# Patient Record
Sex: FEMALE | Race: WHITE | NOT HISPANIC OR LATINO | Employment: OTHER | RURAL
[De-identification: names, ages, dates, MRNs, and addresses within clinical notes are randomized per-mention and may not be internally consistent; named-entity substitution may affect disease eponyms.]

---

## 2021-06-15 ENCOUNTER — OFFICE VISIT (OUTPATIENT)
Dept: FAMILY MEDICINE | Facility: CLINIC | Age: 66
End: 2021-06-15
Payer: MEDICARE

## 2021-06-15 VITALS
BODY MASS INDEX: 27.94 KG/M2 | SYSTOLIC BLOOD PRESSURE: 139 MMHG | DIASTOLIC BLOOD PRESSURE: 80 MMHG | WEIGHT: 148 LBS | HEIGHT: 61 IN | HEART RATE: 93 BPM | TEMPERATURE: 98 F

## 2021-06-15 DIAGNOSIS — K21.9 GASTROESOPHAGEAL REFLUX DISEASE, UNSPECIFIED WHETHER ESOPHAGITIS PRESENT: ICD-10-CM

## 2021-06-15 DIAGNOSIS — E03.9 HYPOTHYROIDISM, UNSPECIFIED TYPE: ICD-10-CM

## 2021-06-15 DIAGNOSIS — J06.9 UPPER RESPIRATORY TRACT INFECTION, UNSPECIFIED TYPE: Primary | ICD-10-CM

## 2021-06-15 DIAGNOSIS — R05.9 COUGH: ICD-10-CM

## 2021-06-15 LAB
CTP QC/QA: YES
SARS-COV-2 AG RESP QL IA.RAPID: NEGATIVE

## 2021-06-15 PROCEDURE — 96372 THER/PROPH/DIAG INJ SC/IM: CPT | Mod: ,,, | Performed by: NURSE PRACTITIONER

## 2021-06-15 PROCEDURE — 3008F PR BODY MASS INDEX (BMI) DOCUMENTED: ICD-10-PCS | Mod: ,,, | Performed by: NURSE PRACTITIONER

## 2021-06-15 PROCEDURE — 96372 PR INJECTION,THERAP/PROPH/DIAG2ST, IM OR SUBCUT: ICD-10-PCS | Mod: ,,, | Performed by: NURSE PRACTITIONER

## 2021-06-15 PROCEDURE — 1159F MED LIST DOCD IN RCRD: CPT | Mod: ,,, | Performed by: NURSE PRACTITIONER

## 2021-06-15 PROCEDURE — 87426 SARS CORONAVIRUS 2 ANTIGEN POCT: ICD-10-PCS | Mod: QW,,, | Performed by: NURSE PRACTITIONER

## 2021-06-15 PROCEDURE — 3008F BODY MASS INDEX DOCD: CPT | Mod: ,,, | Performed by: NURSE PRACTITIONER

## 2021-06-15 PROCEDURE — 99214 PR OFFICE/OUTPT VISIT, EST, LEVL IV, 30-39 MIN: ICD-10-PCS | Mod: 25,,, | Performed by: NURSE PRACTITIONER

## 2021-06-15 PROCEDURE — 1126F AMNT PAIN NOTED NONE PRSNT: CPT | Mod: ,,, | Performed by: NURSE PRACTITIONER

## 2021-06-15 PROCEDURE — 87426 SARSCOV CORONAVIRUS AG IA: CPT | Mod: QW,,, | Performed by: NURSE PRACTITIONER

## 2021-06-15 PROCEDURE — 3288F PR FALLS RISK ASSESSMENT DOCUMENTED: ICD-10-PCS | Mod: ,,, | Performed by: NURSE PRACTITIONER

## 2021-06-15 PROCEDURE — 80053 COMPREHEN METABOLIC PANEL: CPT | Mod: ,,, | Performed by: CLINICAL MEDICAL LABORATORY

## 2021-06-15 PROCEDURE — 1159F PR MEDICATION LIST DOCUMENTED IN MEDICAL RECORD: ICD-10-PCS | Mod: ,,, | Performed by: NURSE PRACTITIONER

## 2021-06-15 PROCEDURE — 99214 OFFICE O/P EST MOD 30 MIN: CPT | Mod: 25,,, | Performed by: NURSE PRACTITIONER

## 2021-06-15 PROCEDURE — 1101F PT FALLS ASSESS-DOCD LE1/YR: CPT | Mod: ,,, | Performed by: NURSE PRACTITIONER

## 2021-06-15 PROCEDURE — 1126F PR PAIN SEVERITY QUANTIFIED, NO PAIN PRESENT: ICD-10-PCS | Mod: ,,, | Performed by: NURSE PRACTITIONER

## 2021-06-15 PROCEDURE — 1101F PR PT FALLS ASSESS DOC 0-1 FALLS W/OUT INJ PAST YR: ICD-10-PCS | Mod: ,,, | Performed by: NURSE PRACTITIONER

## 2021-06-15 PROCEDURE — 3288F FALL RISK ASSESSMENT DOCD: CPT | Mod: ,,, | Performed by: NURSE PRACTITIONER

## 2021-06-15 PROCEDURE — 80053 COMPREHENSIVE METABOLIC PANEL: ICD-10-PCS | Mod: ,,, | Performed by: CLINICAL MEDICAL LABORATORY

## 2021-06-15 RX ORDER — PANTOPRAZOLE SODIUM 40 MG/1
40 TABLET, DELAYED RELEASE ORAL DAILY
COMMUNITY
Start: 2021-06-14 | End: 2023-10-12

## 2021-06-15 RX ORDER — LEVOTHYROXINE SODIUM 75 UG/1
75 TABLET ORAL DAILY
COMMUNITY
Start: 2021-05-10 | End: 2021-06-17 | Stop reason: SDUPTHER

## 2021-06-15 RX ORDER — GUAIFENESIN 600 MG/1
600 TABLET, EXTENDED RELEASE ORAL 2 TIMES DAILY PRN
Qty: 20 TABLET | Refills: 0 | Status: SHIPPED | OUTPATIENT
Start: 2021-06-15 | End: 2021-06-25

## 2021-06-15 RX ORDER — AZITHROMYCIN 250 MG/1
TABLET, FILM COATED ORAL
Qty: 6 TABLET | Refills: 0 | Status: SHIPPED | OUTPATIENT
Start: 2021-06-15 | End: 2021-06-20

## 2021-06-15 RX ORDER — CEFTRIAXONE 1 G/1
1 INJECTION, POWDER, FOR SOLUTION INTRAMUSCULAR; INTRAVENOUS
Status: COMPLETED | OUTPATIENT
Start: 2021-06-15 | End: 2021-06-15

## 2021-06-15 RX ADMIN — CEFTRIAXONE 1 G: 1 INJECTION, POWDER, FOR SOLUTION INTRAMUSCULAR; INTRAVENOUS at 03:06

## 2021-06-16 LAB
ALBUMIN SERPL BCP-MCNC: 3.9 G/DL (ref 3.5–5)
ALBUMIN/GLOB SERPL: 1.3 {RATIO}
ALP SERPL-CCNC: 101 U/L (ref 55–142)
ALT SERPL W P-5'-P-CCNC: 35 U/L (ref 13–56)
ANION GAP SERPL CALCULATED.3IONS-SCNC: 12 MMOL/L (ref 7–16)
AST SERPL W P-5'-P-CCNC: 33 U/L (ref 15–37)
BASOPHILS # BLD AUTO: 0.09 K/UL (ref 0–0.2)
BASOPHILS NFR BLD AUTO: 0.9 % (ref 0–1)
BILIRUB SERPL-MCNC: 0.4 MG/DL (ref 0–1.2)
BUN SERPL-MCNC: 5 MG/DL (ref 7–18)
BUN/CREAT SERPL: 7 (ref 6–20)
BURR CELLS BLD QL SMEAR: ABNORMAL
CALCIUM SERPL-MCNC: 8.7 MG/DL (ref 8.5–10.1)
CHLORIDE SERPL-SCNC: 94 MMOL/L (ref 98–107)
CO2 SERPL-SCNC: 25 MMOL/L (ref 21–32)
CREAT SERPL-MCNC: 0.71 MG/DL (ref 0.55–1.02)
DIFFERENTIAL METHOD BLD: ABNORMAL
EOSINOPHIL # BLD AUTO: 0.71 K/UL (ref 0–0.5)
EOSINOPHIL NFR BLD AUTO: 7 % (ref 1–4)
ERYTHROCYTE [DISTWIDTH] IN BLOOD BY AUTOMATED COUNT: 15.6 % (ref 11.5–14.5)
GLOBULIN SER-MCNC: 2.9 G/DL (ref 2–4)
GLUCOSE SERPL-MCNC: 96 MG/DL (ref 74–106)
HCT VFR BLD AUTO: 36.6 % (ref 38–47)
HGB BLD-MCNC: 12.6 G/DL (ref 12–16)
IMM GRANULOCYTES # BLD AUTO: 0.06 K/UL (ref 0–0.04)
IMM GRANULOCYTES NFR BLD: 0.6 % (ref 0–0.4)
LYMPHOCYTES # BLD AUTO: 1.23 K/UL (ref 1–4.8)
LYMPHOCYTES NFR BLD AUTO: 12.1 % (ref 27–41)
MCH RBC QN AUTO: 28.3 PG (ref 27–31)
MCHC RBC AUTO-ENTMCNC: 34.4 G/DL (ref 32–36)
MCV RBC AUTO: 82.2 FL (ref 80–96)
MONOCYTES # BLD AUTO: 0.63 K/UL (ref 0–0.8)
MONOCYTES NFR BLD AUTO: 6.2 % (ref 2–6)
MPC BLD CALC-MCNC: 14.3 FL (ref 9.4–12.4)
NEUTROPHILS # BLD AUTO: 7.43 K/UL (ref 1.8–7.7)
NEUTROPHILS NFR BLD AUTO: 73.2 % (ref 53–65)
NRBC # BLD AUTO: 0 X10E3/UL
NRBC, AUTO (.00): 0 %
OVALOCYTES BLD QL SMEAR: ABNORMAL
PLATELET # BLD AUTO: 217 K/UL (ref 150–400)
PLATELET MORPHOLOGY: ABNORMAL
POTASSIUM SERPL-SCNC: 5.2 MMOL/L (ref 3.5–5.1)
PROT SERPL-MCNC: 6.8 G/DL (ref 6.4–8.2)
RBC # BLD AUTO: 4.45 M/UL (ref 4.2–5.4)
SODIUM SERPL-SCNC: 126 MMOL/L (ref 136–145)
TSH SERPL DL<=0.005 MIU/L-ACNC: 1.27 UIU/ML (ref 0.36–3.74)
WBC # BLD AUTO: 10.15 K/UL (ref 4.5–11)

## 2021-06-17 RX ORDER — LEVOTHYROXINE SODIUM 75 UG/1
75 TABLET ORAL DAILY
Qty: 90 TABLET | Refills: 0 | Status: SHIPPED | OUTPATIENT
Start: 2021-06-17 | End: 2021-10-07 | Stop reason: SDUPTHER

## 2021-07-16 ENCOUNTER — HOSPITAL ENCOUNTER (EMERGENCY)
Facility: HOSPITAL | Age: 66
Discharge: HOME | End: 2021-07-17
Payer: MEDICARE

## 2021-07-16 DIAGNOSIS — R11.2 NON-INTRACTABLE VOMITING WITH NAUSEA, UNSPECIFIED VOMITING TYPE: ICD-10-CM

## 2021-07-16 DIAGNOSIS — N39.0 URINARY TRACT INFECTION WITHOUT HEMATURIA, SITE UNSPECIFIED: Primary | ICD-10-CM

## 2021-07-16 PROCEDURE — 99283 EMERGENCY DEPT VISIT LOW MDM: CPT | Mod: GF | Performed by: NURSE PRACTITIONER

## 2021-07-16 PROCEDURE — 99284 EMERGENCY DEPT VISIT MOD MDM: CPT | Mod: 25

## 2021-07-16 PROCEDURE — 96375 TX/PRO/DX INJ NEW DRUG ADDON: CPT

## 2021-07-16 PROCEDURE — 96361 HYDRATE IV INFUSION ADD-ON: CPT

## 2021-07-16 PROCEDURE — 96368 THER/DIAG CONCURRENT INF: CPT

## 2021-07-16 PROCEDURE — 96365 THER/PROPH/DIAG IV INF INIT: CPT

## 2021-07-16 RX ORDER — ONDANSETRON 2 MG/ML
4 INJECTION INTRAMUSCULAR; INTRAVENOUS
Status: COMPLETED | OUTPATIENT
Start: 2021-07-17 | End: 2021-07-17

## 2021-07-17 ENCOUNTER — TELEPHONE (OUTPATIENT)
Dept: EMERGENCY MEDICINE | Facility: HOSPITAL | Age: 66
End: 2021-07-17

## 2021-07-17 VITALS
TEMPERATURE: 99 F | WEIGHT: 148 LBS | HEIGHT: 61 IN | DIASTOLIC BLOOD PRESSURE: 87 MMHG | OXYGEN SATURATION: 96 % | HEART RATE: 105 BPM | RESPIRATION RATE: 20 BRPM | SYSTOLIC BLOOD PRESSURE: 118 MMHG | BODY MASS INDEX: 27.94 KG/M2

## 2021-07-17 LAB
ALBUMIN SERPL BCP-MCNC: 4.2 G/DL (ref 3.5–5)
ALBUMIN/GLOB SERPL: 1.4 {RATIO}
ALP SERPL-CCNC: 104 U/L (ref 55–142)
ALT SERPL W P-5'-P-CCNC: 39 U/L (ref 13–56)
ANION GAP SERPL CALCULATED.3IONS-SCNC: 16 MMOL/L (ref 7–16)
ANISOCYTOSIS BLD QL SMEAR: NORMAL
AST SERPL W P-5'-P-CCNC: 21 U/L (ref 15–37)
BACTERIA #/AREA URNS HPF: ABNORMAL /HPF
BASOPHILS # BLD AUTO: 0.03 K/UL (ref 0–0.2)
BASOPHILS NFR BLD AUTO: 0.3 % (ref 0–1)
BILIRUB SERPL-MCNC: 0.7 MG/DL (ref 0–1.2)
BILIRUB UR QL STRIP: NEGATIVE
BUN SERPL-MCNC: 12 MG/DL (ref 7–18)
BUN/CREAT SERPL: 12 (ref 6–20)
CALCIUM SERPL-MCNC: 8.9 MG/DL (ref 8.5–10.1)
CHLORIDE SERPL-SCNC: 108 MMOL/L (ref 98–107)
CLARITY UR: ABNORMAL
CO2 SERPL-SCNC: 25 MMOL/L (ref 21–32)
COLOR UR: ABNORMAL
CREAT SERPL-MCNC: 0.97 MG/DL (ref 0.55–1.02)
DIFFERENTIAL METHOD BLD: ABNORMAL
EOSINOPHIL # BLD AUTO: 0.06 K/UL (ref 0–0.5)
EOSINOPHIL NFR BLD AUTO: 0.6 % (ref 1–4)
ERYTHROCYTE [DISTWIDTH] IN BLOOD BY AUTOMATED COUNT: 14.7 % (ref 11.5–14.5)
GLOBULIN SER-MCNC: 3.1 G/DL (ref 2–4)
GLUCOSE SERPL-MCNC: 124 MG/DL (ref 74–106)
GLUCOSE UR STRIP-MCNC: NEGATIVE MG/DL
HCT VFR BLD AUTO: 41.1 % (ref 38–47)
HGB BLD-MCNC: 13.6 G/DL (ref 12–16)
KETONES UR STRIP-SCNC: 15 MG/DL
LEUKOCYTE ESTERASE UR QL STRIP: ABNORMAL
LYMPHOCYTES # BLD AUTO: 1.38 K/UL (ref 1–4.8)
LYMPHOCYTES NFR BLD AUTO: 12.9 % (ref 27–41)
MCH RBC QN AUTO: 26.5 PG (ref 27–31)
MCHC RBC AUTO-ENTMCNC: 33.1 G/DL (ref 32–36)
MCV RBC AUTO: 80 FL (ref 80–96)
MONOCYTES # BLD AUTO: 0.54 K/UL (ref 0–0.8)
MONOCYTES NFR BLD AUTO: 5.1 % (ref 2–6)
MPC BLD CALC-MCNC: 12.4 FL (ref 9.4–12.4)
MUCOUS THREADS #/AREA URNS HPF: ABNORMAL /HPF
NEUTROPHILS # BLD AUTO: 8.68 K/UL (ref 1.8–7.7)
NEUTROPHILS NFR BLD AUTO: 81.1 % (ref 53–65)
NITRITE UR QL STRIP: NEGATIVE
PH UR STRIP: 6 PH UNITS
PLATELET # BLD AUTO: 293 K/UL (ref 150–400)
PLATELET MORPHOLOGY: NORMAL
POTASSIUM SERPL-SCNC: 4.2 MMOL/L (ref 3.5–5.1)
PROT SERPL-MCNC: 7.3 G/DL (ref 6.4–8.2)
PROT UR QL STRIP: NEGATIVE
RBC # BLD AUTO: 5.14 M/UL (ref 4.2–5.4)
RBC # UR STRIP: NEGATIVE /UL
RBC #/AREA URNS HPF: ABNORMAL /HPF
SODIUM SERPL-SCNC: 145 MMOL/L (ref 136–145)
SP GR UR STRIP: 1.02
SQUAMOUS #/AREA URNS LPF: ABNORMAL /LPF
UROBILINOGEN UR STRIP-ACNC: 0.2 MG/DL
WBC # BLD AUTO: 10.69 K/UL (ref 4.5–11)
WBC #/AREA URNS HPF: ABNORMAL /HPF

## 2021-07-17 PROCEDURE — 85025 COMPLETE CBC W/AUTO DIFF WBC: CPT | Performed by: NURSE PRACTITIONER

## 2021-07-17 PROCEDURE — 87086 URINE CULTURE/COLONY COUNT: CPT | Performed by: NURSE PRACTITIONER

## 2021-07-17 PROCEDURE — 81003 URINALYSIS AUTO W/O SCOPE: CPT | Performed by: NURSE PRACTITIONER

## 2021-07-17 PROCEDURE — 36415 COLL VENOUS BLD VENIPUNCTURE: CPT | Performed by: NURSE PRACTITIONER

## 2021-07-17 PROCEDURE — 81001 URINALYSIS AUTO W/SCOPE: CPT | Performed by: NURSE PRACTITIONER

## 2021-07-17 PROCEDURE — 80053 COMPREHEN METABOLIC PANEL: CPT | Performed by: NURSE PRACTITIONER

## 2021-07-17 PROCEDURE — 63600175 PHARM REV CODE 636 W HCPCS: Performed by: NURSE PRACTITIONER

## 2021-07-17 PROCEDURE — 25000003 PHARM REV CODE 250: Performed by: NURSE PRACTITIONER

## 2021-07-17 RX ORDER — CEFUROXIME AXETIL 250 MG/1
250 TABLET ORAL 2 TIMES DAILY
Qty: 14 TABLET | Refills: 0 | Status: SHIPPED | OUTPATIENT
Start: 2021-07-17 | End: 2022-04-07 | Stop reason: CLARIF

## 2021-07-17 RX ORDER — ONDANSETRON 4 MG/1
4 TABLET, ORALLY DISINTEGRATING ORAL EVERY 8 HOURS PRN
Qty: 20 TABLET | Refills: 0 | Status: SHIPPED | OUTPATIENT
Start: 2021-07-17 | End: 2022-04-07 | Stop reason: CLARIF

## 2021-07-17 RX ADMIN — CEFTRIAXONE 1 G: 1 INJECTION, POWDER, FOR SOLUTION INTRAMUSCULAR; INTRAVENOUS at 01:07

## 2021-07-17 RX ADMIN — SODIUM CHLORIDE 1000 ML: 9 INJECTION, SOLUTION INTRAVENOUS at 12:07

## 2021-07-17 RX ADMIN — PROMETHAZINE HYDROCHLORIDE 25 MG: 25 INJECTION INTRAMUSCULAR; INTRAVENOUS at 01:07

## 2021-07-17 RX ADMIN — ONDANSETRON 4 MG: 2 INJECTION INTRAMUSCULAR; INTRAVENOUS at 12:07

## 2021-07-19 LAB — UA COMPLETE W REFLEX CULTURE PNL UR: NORMAL

## 2021-10-07 RX ORDER — LEVOTHYROXINE SODIUM 75 UG/1
75 TABLET ORAL DAILY
Qty: 90 TABLET | Refills: 0 | Status: CANCELLED | OUTPATIENT
Start: 2021-10-07

## 2021-10-07 RX ORDER — LEVOTHYROXINE SODIUM 75 UG/1
75 TABLET ORAL DAILY
Qty: 90 TABLET | Refills: 0 | Status: SHIPPED | OUTPATIENT
Start: 2021-10-07 | End: 2022-01-19 | Stop reason: SDUPTHER

## 2022-01-19 DIAGNOSIS — E03.9 HYPOTHYROIDISM, UNSPECIFIED TYPE: Primary | ICD-10-CM

## 2022-01-19 RX ORDER — LEVOTHYROXINE SODIUM 75 UG/1
75 TABLET ORAL DAILY
Qty: 30 TABLET | Refills: 0 | Status: SHIPPED | OUTPATIENT
Start: 2022-01-19 | End: 2022-01-20 | Stop reason: SDUPTHER

## 2022-01-20 ENCOUNTER — OFFICE VISIT (OUTPATIENT)
Dept: FAMILY MEDICINE | Facility: CLINIC | Age: 67
End: 2022-01-20
Payer: MEDICARE

## 2022-01-20 DIAGNOSIS — Z20.822 COUGH WITH EXPOSURE TO SEVERE ACUTE RESPIRATORY SYNDROME CORONAVIRUS 2 (SARS-COV-2): Primary | ICD-10-CM

## 2022-01-20 DIAGNOSIS — R05.8 COUGH WITH EXPOSURE TO SEVERE ACUTE RESPIRATORY SYNDROME CORONAVIRUS 2 (SARS-COV-2): Primary | ICD-10-CM

## 2022-01-20 DIAGNOSIS — Z79.899 LONG-TERM USE OF HIGH-RISK MEDICATION: ICD-10-CM

## 2022-01-20 DIAGNOSIS — E03.9 HYPOTHYROIDISM, UNSPECIFIED TYPE: ICD-10-CM

## 2022-01-20 LAB
CTP QC/QA: YES
SARS-COV-2 AG RESP QL IA.RAPID: NEGATIVE

## 2022-01-20 PROCEDURE — 87426 SARSCOV CORONAVIRUS AG IA: CPT | Mod: QW,,, | Performed by: NURSE PRACTITIONER

## 2022-01-20 PROCEDURE — 87426 SARS CORONAVIRUS 2 ANTIGEN POCT: ICD-10-PCS | Mod: QW,,, | Performed by: NURSE PRACTITIONER

## 2022-01-20 PROCEDURE — 1160F PR REVIEW ALL MEDS BY PRESCRIBER/CLIN PHARMACIST DOCUMENTED: ICD-10-PCS | Mod: ,,, | Performed by: NURSE PRACTITIONER

## 2022-01-20 PROCEDURE — 1159F MED LIST DOCD IN RCRD: CPT | Mod: ,,, | Performed by: NURSE PRACTITIONER

## 2022-01-20 PROCEDURE — 99213 PR OFFICE/OUTPT VISIT, EST, LEVL III, 20-29 MIN: ICD-10-PCS | Mod: ,,, | Performed by: NURSE PRACTITIONER

## 2022-01-20 PROCEDURE — 1160F RVW MEDS BY RX/DR IN RCRD: CPT | Mod: ,,, | Performed by: NURSE PRACTITIONER

## 2022-01-20 PROCEDURE — 99213 OFFICE O/P EST LOW 20 MIN: CPT | Mod: ,,, | Performed by: NURSE PRACTITIONER

## 2022-01-20 PROCEDURE — 1159F PR MEDICATION LIST DOCUMENTED IN MEDICAL RECORD: ICD-10-PCS | Mod: ,,, | Performed by: NURSE PRACTITIONER

## 2022-01-20 RX ORDER — LEVOTHYROXINE SODIUM 75 UG/1
75 TABLET ORAL DAILY
Qty: 30 TABLET | Refills: 5 | Status: SHIPPED | OUTPATIENT
Start: 2022-01-20 | End: 2023-10-12

## 2022-01-20 NOTE — PATIENT INSTRUCTIONS
Lab not available in clinic at this time  Will obtain lab at next office visit     COVID griselda antigen negative discussed potential for false negative results  S/s for which to immediately rtc or ed discussed     Refills on levothyroxine sent to pharmacy

## 2022-01-24 NOTE — PROGRESS NOTES
Clinic note     Patient name: Felicia Grijalva is a 66 y.o. female   Chief compliant   Chief Complaint   Patient presents with    Thyroid Problem     Patient janet at the lab here for thyroid lab, wanting covid test says her sister tested  positive around christofer.       Subjective     History of present illness   Pt seen janet for COVID testing   Pt is symptomatic   Symptoms started 2-3 days ago   Exposure sister has been sick for a week, but tested negative for COVID  Vaccine no vaccination  Requesting refills on levothyroxine while at clinic today         Social History     Tobacco Use    Smoking status: Never Smoker    Smokeless tobacco: Never Used   Substance Use Topics    Alcohol use: Not Currently    Drug use: Never       Review of patient's allergies indicates:  No Known Allergies    Past Medical History:   Diagnosis Date    Bipolar disorder     GERD (gastroesophageal reflux disease)     Hypothyroidism        History reviewed. No pertinent surgical history.     History reviewed. No pertinent family history.      Current Outpatient Medications:     cefUROXime (CEFTIN) 250 MG tablet, Take 1 tablet (250 mg total) by mouth 2 (two) times daily., Disp: 14 tablet, Rfl: 0    levothyroxine (SYNTHROID) 75 MCG tablet, Take 1 tablet (75 mcg total) by mouth once daily., Disp: 30 tablet, Rfl: 5    ondansetron (ZOFRAN-ODT) 4 MG TbDL, Take 1 tablet (4 mg total) by mouth every 8 (eight) hours as needed (nausea/vomiting)., Disp: 20 tablet, Rfl: 0    pantoprazole (PROTONIX) 40 MG tablet, Take 40 mg by mouth once daily., Disp: , Rfl:     Review of Systems   Constitutional: Negative for chills, fever and unexpected weight change.   HENT: Negative for nasal congestion and sore throat.    Respiratory: Negative for cough and shortness of breath.    Cardiovascular: Negative for chest pain.   Gastrointestinal: Negative for diarrhea, nausea and vomiting.   Genitourinary: Negative for dysuria.   Musculoskeletal:  Negative for myalgias.   Neurological: Negative for headaches.       Objective     There were no vitals taken for this visit.    Physical Exam   Constitutional: She is oriented to person, place, and time. No distress.   HENT:   Head: Normocephalic.   Eyes: Pupils are equal, round, and reactive to light. Conjunctivae are normal.   Cardiovascular: Normal rate and regular rhythm.   Pulmonary/Chest: Breath sounds normal. No respiratory distress. She has no wheezes. She has no rhonchi. She has no rales.   Neurological: She is alert and oriented to person, place, and time.   Skin: Skin is warm and dry.   Psychiatric: Her behavior is normal. Mood normal.     Component Ref Range & Units 4 d ago 7 mo ago   SARS Coronavirus 2 Antigen Negative Negative  Negative     Acceptable  Yes  Yes               Specimen Collected: 01/20/22 16:13             Lab Results   Component Value Date    WBC 10.69 07/17/2021    HGB 13.6 07/17/2021    HCT 41.1 07/17/2021    MCV 80.0 07/17/2021     07/17/2021       CMP  Sodium   Date Value Ref Range Status   07/17/2021 145 136 - 145 mmol/L Final     Potassium   Date Value Ref Range Status   07/17/2021 4.2 3.5 - 5.1 mmol/L Final     Chloride   Date Value Ref Range Status   07/17/2021 108 (H) 98 - 107 mmol/L Final     CO2   Date Value Ref Range Status   07/17/2021 25 21 - 32 mmol/L Final     Glucose   Date Value Ref Range Status   07/17/2021 124 (H) 74 - 106 mg/dL Final     BUN   Date Value Ref Range Status   07/17/2021 12 7 - 18 mg/dL Final     Creatinine   Date Value Ref Range Status   07/17/2021 0.97 0.55 - 1.02 mg/dL Final     Calcium   Date Value Ref Range Status   07/17/2021 8.9 8.5 - 10.1 mg/dL Final     Total Protein   Date Value Ref Range Status   07/17/2021 7.3 6.4 - 8.2 g/dL Final     Albumin   Date Value Ref Range Status   07/17/2021 4.2 3.5 - 5.0 g/dL Final     Bilirubin, Total   Date Value Ref Range Status   07/17/2021 0.7 >0.0 - 1.2 mg/dL Final     Alk Phos   Date  Value Ref Range Status   07/17/2021 104 55 - 142 U/L Final     AST   Date Value Ref Range Status   07/17/2021 21 15 - 37 U/L Final     ALT   Date Value Ref Range Status   07/17/2021 39 13 - 56 U/L Final     Anion Gap   Date Value Ref Range Status   07/17/2021 16 7 - 16 mmol/L Final     eGFR   Date Value Ref Range Status   07/17/2021 61 >=60 mL/min/1.73m² Final     Lab Results   Component Value Date    TSH 1.270 06/15/2021     No results found for: CHOL  No results found for: HDL  No results found for: LDLCALC  No results found for: TRIG  No results found for: CHOLHDL  No results found for: LABA1C, HGBA1C      Assessment and Plan   Cough with exposure to severe acute respiratory syndrome coronavirus 2 (SARS-CoV-2)  -     SARS Coronavirus 2 Antigen, POCT    Hypothyroidism, unspecified type  -     levothyroxine (SYNTHROID) 75 MCG tablet; Take 1 tablet (75 mcg total) by mouth once daily.  Dispense: 30 tablet; Refill: 5    Long-term use of high-risk medication          Patient Instructions  Patient Instructions   Lab not available in clinic at this time  Will obtain lab at next office visit     COVID griselda antigen negative discussed potential for false negative results  S/s for which to immediately rtc or ed discussed

## 2022-04-07 ENCOUNTER — HOSPITAL ENCOUNTER (EMERGENCY)
Facility: HOSPITAL | Age: 67
Discharge: PSYCHIATRIC OR SUBSTANCE USE FACILITY | End: 2022-04-07
Attending: FAMILY MEDICINE
Payer: MEDICARE

## 2022-04-07 VITALS
TEMPERATURE: 99 F | OXYGEN SATURATION: 94 % | BODY MASS INDEX: 24.55 KG/M2 | SYSTOLIC BLOOD PRESSURE: 135 MMHG | DIASTOLIC BLOOD PRESSURE: 71 MMHG | HEART RATE: 105 BPM | HEIGHT: 61 IN | WEIGHT: 130 LBS | RESPIRATION RATE: 18 BRPM

## 2022-04-07 DIAGNOSIS — F29 PSYCHOSIS, UNSPECIFIED PSYCHOSIS TYPE: Primary | ICD-10-CM

## 2022-04-07 LAB
ALBUMIN SERPL BCP-MCNC: 4.5 G/DL (ref 3.5–5)
ALBUMIN/GLOB SERPL: 1.6 {RATIO}
ALP SERPL-CCNC: 90 U/L (ref 55–142)
ALT SERPL W P-5'-P-CCNC: 33 U/L (ref 13–56)
AMPHET UR QL SCN: NEGATIVE
ANION GAP SERPL CALCULATED.3IONS-SCNC: 19 MMOL/L (ref 7–16)
APAP SERPL-MCNC: 21 ΜG/ML (ref 10–30)
AST SERPL W P-5'-P-CCNC: 28 U/L (ref 15–37)
BARBITURATES UR QL SCN: NEGATIVE
BASOPHILS # BLD AUTO: 0.04 K/UL (ref 0–0.2)
BASOPHILS NFR BLD AUTO: 0.3 % (ref 0–1)
BENZODIAZ METAB UR QL SCN: NEGATIVE
BILIRUB SERPL-MCNC: 0.2 MG/DL (ref 0–1.2)
BILIRUB UR QL STRIP: NEGATIVE
BUN SERPL-MCNC: 10 MG/DL (ref 7–18)
BUN/CREAT SERPL: 11 (ref 6–20)
CALCIUM SERPL-MCNC: 9.3 MG/DL (ref 8.5–10.1)
CANNABINOIDS UR QL SCN: NEGATIVE
CHLORIDE SERPL-SCNC: 101 MMOL/L (ref 98–107)
CLARITY UR: CLEAR
CO2 SERPL-SCNC: 20 MMOL/L (ref 21–32)
COCAINE UR QL SCN: NEGATIVE
COLOR UR: ABNORMAL
CREAT SERPL-MCNC: 0.95 MG/DL (ref 0.55–1.02)
DIFFERENTIAL METHOD BLD: ABNORMAL
EOSINOPHIL # BLD AUTO: 0.03 K/UL (ref 0–0.5)
EOSINOPHIL NFR BLD AUTO: 0.2 % (ref 1–4)
ERYTHROCYTE [DISTWIDTH] IN BLOOD BY AUTOMATED COUNT: 15.6 % (ref 11.5–14.5)
ETHANOL, BLOOD (CATEGORY): NOT DETECTED
GLOBULIN SER-MCNC: 2.9 G/DL (ref 2–4)
GLUCOSE SERPL-MCNC: 119 MG/DL (ref 74–106)
GLUCOSE UR STRIP-MCNC: NEGATIVE MG/DL
HCT VFR BLD AUTO: 40.8 % (ref 38–47)
HGB BLD-MCNC: 13.4 G/DL (ref 12–16)
IMM GRANULOCYTES # BLD AUTO: 0.05 K/UL (ref 0–0.04)
IMM GRANULOCYTES NFR BLD: 0.4 % (ref 0–0.4)
KETONES UR STRIP-SCNC: 40 MG/DL
LEUKOCYTE ESTERASE UR QL STRIP: NEGATIVE
LYMPHOCYTES # BLD AUTO: 1.18 K/UL (ref 1–4.8)
LYMPHOCYTES NFR BLD AUTO: 9 % (ref 27–41)
MCH RBC QN AUTO: 27.1 PG (ref 27–31)
MCHC RBC AUTO-ENTMCNC: 32.8 G/DL (ref 32–36)
MCV RBC AUTO: 82.4 FL (ref 80–96)
MONOCYTES # BLD AUTO: 0.31 K/UL (ref 0–0.8)
MONOCYTES NFR BLD AUTO: 2.4 % (ref 2–6)
MPC BLD CALC-MCNC: 11.3 FL (ref 9.4–12.4)
NEUTROPHILS # BLD AUTO: 11.44 K/UL (ref 1.8–7.7)
NEUTROPHILS NFR BLD AUTO: 87.7 % (ref 53–65)
NITRITE UR QL STRIP: NEGATIVE
NRBC # BLD AUTO: 0 X10E3/UL
NRBC, AUTO (.00): 0 %
OPIATES UR QL SCN: NEGATIVE
PCP UR QL SCN: NEGATIVE
PH UR STRIP: 5 PH UNITS
PLATELET # BLD AUTO: 273 K/UL (ref 150–400)
POTASSIUM SERPL-SCNC: 3.8 MMOL/L (ref 3.5–5.1)
PROT SERPL-MCNC: 7.4 G/DL (ref 6.4–8.2)
PROT UR QL STRIP: ABNORMAL
RBC # BLD AUTO: 4.95 M/UL (ref 4.2–5.4)
RBC # UR STRIP: NEGATIVE /UL
SALICYLATES SERPL-MCNC: 22.7 MG/DL (ref 3–30)
SARS-COV-2 RDRP RESP QL NAA+PROBE: NEGATIVE
SODIUM SERPL-SCNC: 136 MMOL/L (ref 136–145)
SP GR UR STRIP: >=1.03
UROBILINOGEN UR STRIP-ACNC: 0.2 MG/DL
WBC # BLD AUTO: 13.05 K/UL (ref 4.5–11)

## 2022-04-07 PROCEDURE — 80053 COMPREHEN METABOLIC PANEL: CPT | Performed by: FAMILY MEDICINE

## 2022-04-07 PROCEDURE — 25000003 PHARM REV CODE 250: Performed by: FAMILY MEDICINE

## 2022-04-07 PROCEDURE — 80307 DRUG TEST PRSMV CHEM ANLYZR: CPT | Performed by: FAMILY MEDICINE

## 2022-04-07 PROCEDURE — 81003 URINALYSIS AUTO W/O SCOPE: CPT | Performed by: FAMILY MEDICINE

## 2022-04-07 PROCEDURE — 99284 EMERGENCY DEPT VISIT MOD MDM: CPT | Mod: ,,, | Performed by: FAMILY MEDICINE

## 2022-04-07 PROCEDURE — 36415 COLL VENOUS BLD VENIPUNCTURE: CPT | Performed by: FAMILY MEDICINE

## 2022-04-07 PROCEDURE — 87635 SARS-COV-2 COVID-19 AMP PRB: CPT | Performed by: FAMILY MEDICINE

## 2022-04-07 PROCEDURE — 80307 DRUG TEST PRSMV CHEM ANLYZR: CPT | Mod: 59 | Performed by: FAMILY MEDICINE

## 2022-04-07 PROCEDURE — 99284 PR EMERGENCY DEPT VISIT,LEVEL IV: ICD-10-PCS | Mod: ,,, | Performed by: FAMILY MEDICINE

## 2022-04-07 PROCEDURE — 99285 EMERGENCY DEPT VISIT HI MDM: CPT

## 2022-04-07 PROCEDURE — 82077 ASSAY SPEC XCP UR&BREATH IA: CPT | Performed by: FAMILY MEDICINE

## 2022-04-07 PROCEDURE — 85025 COMPLETE CBC W/AUTO DIFF WBC: CPT | Performed by: FAMILY MEDICINE

## 2022-04-07 PROCEDURE — 80143 DRUG ASSAY ACETAMINOPHEN: CPT | Performed by: FAMILY MEDICINE

## 2022-04-07 RX ORDER — IBUPROFEN 400 MG/1
800 TABLET ORAL EVERY 6 HOURS PRN
Status: DISCONTINUED | OUTPATIENT
Start: 2022-04-07 | End: 2022-04-07 | Stop reason: HOSPADM

## 2022-04-07 RX ORDER — ACETAMINOPHEN 500 MG
1000 TABLET ORAL
Status: COMPLETED | OUTPATIENT
Start: 2022-04-07 | End: 2022-04-07

## 2022-04-07 RX ORDER — IBUPROFEN 400 MG/1
800 TABLET ORAL
Status: DISCONTINUED | OUTPATIENT
Start: 2022-04-07 | End: 2022-04-07

## 2022-04-07 RX ORDER — ONDANSETRON 4 MG/1
4 TABLET, ORALLY DISINTEGRATING ORAL
Status: COMPLETED | OUTPATIENT
Start: 2022-04-07 | End: 2022-04-07

## 2022-04-07 RX ADMIN — ONDANSETRON 4 MG: 4 TABLET, ORALLY DISINTEGRATING ORAL at 12:04

## 2022-04-07 RX ADMIN — IBUPROFEN 800 MG: 400 TABLET, FILM COATED ORAL at 12:04

## 2022-04-07 RX ADMIN — ACETAMINOPHEN 1000 MG: 500 TABLET ORAL at 10:04
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
## 2022-04-28 ENCOUNTER — HOSPITAL ENCOUNTER (EMERGENCY)
Facility: HOSPITAL | Age: 67
Discharge: HOME | End: 2022-04-28
Payer: MEDICARE

## 2022-04-28 VITALS
HEART RATE: 89 BPM | WEIGHT: 130 LBS | HEIGHT: 61 IN | TEMPERATURE: 99 F | OXYGEN SATURATION: 95 % | RESPIRATION RATE: 18 BRPM | BODY MASS INDEX: 24.55 KG/M2 | SYSTOLIC BLOOD PRESSURE: 126 MMHG | DIASTOLIC BLOOD PRESSURE: 70 MMHG

## 2022-04-28 DIAGNOSIS — F41.9 ANXIETY: Primary | ICD-10-CM

## 2022-04-28 PROCEDURE — 99283 EMERGENCY DEPT VISIT LOW MDM: CPT | Performed by: EMERGENCY MEDICINE

## 2022-04-28 PROCEDURE — 99283 EMERGENCY DEPT VISIT LOW MDM: CPT

## 2022-04-28 PROCEDURE — 25000003 PHARM REV CODE 250

## 2022-04-28 RX ORDER — HYDROXYZINE PAMOATE 25 MG/1
CAPSULE ORAL
Status: COMPLETED
Start: 2022-04-28 | End: 2022-04-28

## 2022-04-28 RX ORDER — RISPERIDONE 2 MG/1
2 TABLET ORAL 2 TIMES DAILY
COMMUNITY
Start: 2022-04-14

## 2022-04-28 RX ORDER — HYDROXYZINE PAMOATE 25 MG/1
25 CAPSULE ORAL
Status: COMPLETED | OUTPATIENT
Start: 2022-04-28 | End: 2022-04-28

## 2022-04-28 RX ORDER — HYDROXYZINE PAMOATE 25 MG/1
25 CAPSULE ORAL EVERY 6 HOURS PRN
Qty: 8 CAPSULE | Refills: 0 | Status: SHIPPED | OUTPATIENT
Start: 2022-04-28 | End: 2022-04-30

## 2022-04-28 RX ADMIN — HYDROXYZINE PAMOATE 25 MG: 25 CAPSULE ORAL at 06:04

## 2022-04-28 NOTE — ED TRIAGE NOTES
Pt presents to ed complaining of anxiety/nervousness. Pt states she is hearing voices telling her they are going to kill her. Pt states the voices are not telling her to kill herself but they are wanting her to die for other people.

## 2022-04-29 NOTE — ED PROVIDER NOTES
"Encounter Date: 4/28/2022       History     Chief Complaint   Patient presents with    Paranoid    Anxiety     PT IS A 66 YR OLD WF WITH ANXIETY AFTER SKIN BIOPSY TODAY PER DR HERNANDEZ IN [place]. PT HAS HX PARANOID SCHIZOPHRENIA WITH ADMISSION AT [facility] THIS YR AND TREATED WITH RISPERIDAL PER DR CHRISTIANSON. PT ALSO SEES MARTY NORMA AT [facility].  PT DENIES SUICIDAL OR HOMICIDAL IDEATIONS. PT STATES SHE RESIDES WITH SISTER AND SISTER IS WORKING NIGHT SHIFT ; SHE IS HER WITH FAMILY MEMBER.        Review of patient's allergies indicates:  No Known Allergies  Past Medical History:   Diagnosis Date    Bipolar disorder     GERD (gastroesophageal reflux disease)     Hypothyroidism     Schizophrenia, unspecified      History reviewed. No pertinent surgical history.  History reviewed. No pertinent family history.  Social History     Tobacco Use    Smoking status: Never Smoker    Smokeless tobacco: Never Used   Substance Use Topics    Alcohol use: Never    Drug use: Never     Review of Systems   Constitutional: Negative for activity change and fever.   HENT: Negative.  Negative for sore throat.    Eyes: Negative.    Respiratory: Negative.  Negative for shortness of breath.    Cardiovascular: Negative.  Negative for chest pain.   Gastrointestinal: Negative.  Negative for nausea.   Endocrine: Negative.    Genitourinary: Negative.  Negative for dysuria.   Musculoskeletal: Negative.  Negative for back pain.   Skin: Negative.  Negative for rash.   Neurological: Negative for weakness.   Hematological: Does not bruise/bleed easily.   Psychiatric/Behavioral: Positive for hallucinations (PT HAS CHRONIC HALLUCINATIONS "TELLING ME THEY ARE GOING TO HURT ME") and sleep disturbance. Negative for agitation, confusion, decreased concentration, dysphoric mood, self-injury and suicidal ideas. The patient is nervous/anxious. The patient is not hyperactive.        Physical Exam     Initial Vitals [04/28/22 1826]   BP Pulse " Resp Temp SpO2   (!) 145/72 102 19 98.6 °F (37 °C) 95 %      MAP       --         Physical Exam    Nursing note and vitals reviewed.  Constitutional: She appears well-developed and well-nourished. She is cooperative.   HENT:   Head: Normocephalic and atraumatic.   Eyes: Conjunctivae and EOM are normal. Pupils are equal, round, and reactive to light.   Neck: Trachea normal. Neck supple.   Normal range of motion.  Cardiovascular: Regular rhythm, normal heart sounds and intact distal pulses.   Pulses:       Radial pulses are 3+ on the right side and 3+ on the left side.        Dorsalis pedis pulses are 3+ on the right side and 3+ on the left side.   Abdominal: Abdomen is soft. Bowel sounds are normal. She exhibits no distension. There is no abdominal tenderness.   Musculoskeletal:         General: Normal range of motion.      Right shoulder: Normal.      Left shoulder: Normal.      Right upper arm: Normal.      Left upper arm: Normal.      Right elbow: Normal.      Left elbow: Normal.      Right forearm: Normal.      Left forearm: Normal.      Right wrist: Normal.      Left wrist: Normal.      Right hand: Normal.      Left hand: Normal.      Cervical back: Normal range of motion and neck supple.      Comments: AMBULATORY     Lymphadenopathy:     She has no cervical adenopathy.     She has no axillary adenopathy.   Neurological: She is alert and oriented to person, place, and time. She has normal strength. No cranial nerve deficit or sensory deficit. She displays a negative Romberg sign. GCS eye subscore is 4. GCS verbal subscore is 5. GCS motor subscore is 6.   Reflex Scores:       Tricep reflexes are 4+ on the left side.       Bicep reflexes are 4+ on the right side and 4+ on the left side.       Brachioradialis reflexes are 4+ on the right side and 4+ on the left side.       Patellar reflexes are 4+ on the right side and 4+ on the left side.       Achilles reflexes are 4+ on the right side and 4+ on the left  side.  Skin: Skin is warm and dry. Capillary refill takes less than 2 seconds.   POST OP SKIN BX ON ABDOMEN APPEAR NORMAL POSTOP APPEARANCE   Psychiatric: Her speech is normal. Cognition and memory are normal.   PT IS ANXIOUS  PT DENIES SUICIDAL/HOMICIDAL IDEATIONS  PT HAS CHRONIC HALLUCINATIONS -AUDITORY TELLING HER THEY ARE GOING TO HURT HER; THEY HAVE NOT TOLD HER TO HURT HERSELF OR ANYONE ELSE         Medical Screening Exam   See Full Note    ED Course   Procedures  Labs Reviewed - No data to display       Imaging Results    None          Medications   hydrOXYzine pamoate capsule 25 mg (25 mg Oral Given 4/28/22 2465)     Medical Decision Making:   ED Management:  PT IS STABLE FOR DISCHARGE WITH FAMILY  AND ADVISED SHE SHOULD NOT STAY ALONE TON IGHT                   Clinical Impression:   Final diagnoses:  [F41.9] Anxiety (Primary)          ED Disposition Condition    Discharge Stable        ED Prescriptions     Medication Sig Dispense Start Date End Date Auth. Provider    hydrOXYzine pamoate (VISTARIL) 25 MG Cap Take 1 capsule (25 mg total) by mouth every 6 (six) hours as needed (ANXIETY). 8 capsule 4/28/2022 4/30/2022 Amanda Ross MD        Follow-up Information     Follow up With Specialties Details Why Contact Info    [facility] In [facility] day  [address] [facility]  [facility] [place] [id number] [phone]             Amanda Ross MD  04/28/22 6512

## 2022-06-02 ENCOUNTER — HOSPITAL ENCOUNTER (EMERGENCY)
Facility: HOSPITAL | Age: 67
Discharge: HOME | End: 2022-06-02
Payer: MEDICARE

## 2022-06-02 VITALS
SYSTOLIC BLOOD PRESSURE: 113 MMHG | OXYGEN SATURATION: 96 % | HEIGHT: 61 IN | WEIGHT: 133 LBS | BODY MASS INDEX: 25.11 KG/M2 | DIASTOLIC BLOOD PRESSURE: 72 MMHG | RESPIRATION RATE: 18 BRPM | HEART RATE: 88 BPM | TEMPERATURE: 98 F

## 2022-06-02 DIAGNOSIS — R11.0 NAUSEA: ICD-10-CM

## 2022-06-02 DIAGNOSIS — F41.9 ANXIETY: Primary | ICD-10-CM

## 2022-06-02 DIAGNOSIS — R53.1 WEAKNESS: ICD-10-CM

## 2022-06-02 LAB
ALBUMIN SERPL BCP-MCNC: 4.2 G/DL (ref 3.5–5)
ALBUMIN/GLOB SERPL: 1.6 {RATIO}
ALP SERPL-CCNC: 94 U/L (ref 55–142)
ALT SERPL W P-5'-P-CCNC: 25 U/L (ref 13–56)
AMPHET UR QL SCN: NEGATIVE
ANION GAP SERPL CALCULATED.3IONS-SCNC: 14 MMOL/L (ref 7–16)
AST SERPL W P-5'-P-CCNC: 22 U/L (ref 15–37)
BARBITURATES UR QL SCN: NEGATIVE
BASOPHILS # BLD AUTO: 0.03 K/UL (ref 0–0.2)
BASOPHILS NFR BLD AUTO: 0.4 % (ref 0–1)
BENZODIAZ METAB UR QL SCN: NEGATIVE
BILIRUB SERPL-MCNC: 0.2 MG/DL (ref 0–1.2)
BILIRUB UR QL STRIP: NEGATIVE
BUN SERPL-MCNC: 10 MG/DL (ref 7–18)
BUN/CREAT SERPL: 10 (ref 6–20)
CALCIUM SERPL-MCNC: 8.8 MG/DL (ref 8.5–10.1)
CANNABINOIDS UR QL SCN: NEGATIVE
CHLORIDE SERPL-SCNC: 94 MMOL/L (ref 98–107)
CLARITY UR: CLEAR
CO2 SERPL-SCNC: 24 MMOL/L (ref 21–32)
COCAINE UR QL SCN: NEGATIVE
COLOR UR: YELLOW
CREAT SERPL-MCNC: 0.98 MG/DL (ref 0.55–1.02)
DIFFERENTIAL METHOD BLD: ABNORMAL
EOSINOPHIL # BLD AUTO: 0.11 K/UL (ref 0–0.5)
EOSINOPHIL NFR BLD AUTO: 1.5 % (ref 1–4)
ERYTHROCYTE [DISTWIDTH] IN BLOOD BY AUTOMATED COUNT: 13.7 % (ref 11.5–14.5)
GLOBULIN SER-MCNC: 2.6 G/DL (ref 2–4)
GLUCOSE SERPL-MCNC: 106 MG/DL (ref 74–106)
GLUCOSE UR STRIP-MCNC: NEGATIVE MG/DL
HCT VFR BLD AUTO: 38.8 % (ref 38–47)
HGB BLD-MCNC: 13 G/DL (ref 12–16)
KETONES UR STRIP-SCNC: NEGATIVE MG/DL
LEUKOCYTE ESTERASE UR QL STRIP: NEGATIVE
LYMPHOCYTES # BLD AUTO: 1.32 K/UL (ref 1–4.8)
LYMPHOCYTES NFR BLD AUTO: 18 % (ref 27–41)
MCH RBC QN AUTO: 27.4 PG (ref 27–31)
MCHC RBC AUTO-ENTMCNC: 33.5 G/DL (ref 32–36)
MCV RBC AUTO: 81.7 FL (ref 80–96)
MONOCYTES # BLD AUTO: 0.49 K/UL (ref 0–0.8)
MONOCYTES NFR BLD AUTO: 6.7 % (ref 2–6)
MPC BLD CALC-MCNC: 12.2 FL (ref 9.4–12.4)
NEUTROPHILS # BLD AUTO: 5.4 K/UL (ref 1.8–7.7)
NEUTROPHILS NFR BLD AUTO: 73.4 % (ref 53–65)
NITRITE UR QL STRIP: NEGATIVE
OPIATES UR QL SCN: NEGATIVE
PCP UR QL SCN: NEGATIVE
PH UR STRIP: 6 PH UNITS
PLATELET # BLD AUTO: 227 K/UL (ref 150–400)
POTASSIUM SERPL-SCNC: 4.2 MMOL/L (ref 3.5–5.1)
PROT SERPL-MCNC: 6.8 G/DL (ref 6.4–8.2)
PROT UR QL STRIP: NEGATIVE
RBC # BLD AUTO: 4.75 M/UL (ref 4.2–5.4)
RBC # UR STRIP: NEGATIVE /UL
SODIUM SERPL-SCNC: 128 MMOL/L (ref 136–145)
SP GR UR STRIP: 1.01
TROPONIN I SERPL HS-MCNC: 4.9 PG/ML
UROBILINOGEN UR STRIP-ACNC: 0.2 MG/DL
WBC # BLD AUTO: 7.35 K/UL (ref 4.5–11)

## 2022-06-02 PROCEDURE — 36415 COLL VENOUS BLD VENIPUNCTURE: CPT

## 2022-06-02 PROCEDURE — 96361 HYDRATE IV INFUSION ADD-ON: CPT

## 2022-06-02 PROCEDURE — 99284 EMERGENCY DEPT VISIT MOD MDM: CPT

## 2022-06-02 PROCEDURE — 63600175 PHARM REV CODE 636 W HCPCS

## 2022-06-02 PROCEDURE — 99285 EMERGENCY DEPT VISIT HI MDM: CPT | Mod: 25

## 2022-06-02 PROCEDURE — 96374 THER/PROPH/DIAG INJ IV PUSH: CPT

## 2022-06-02 PROCEDURE — 93005 ELECTROCARDIOGRAM TRACING: CPT

## 2022-06-02 PROCEDURE — 84484 ASSAY OF TROPONIN QUANT: CPT

## 2022-06-02 PROCEDURE — 80307 DRUG TEST PRSMV CHEM ANLYZR: CPT

## 2022-06-02 PROCEDURE — 93010 ELECTROCARDIOGRAM REPORT: CPT | Performed by: HOSPITALIST

## 2022-06-02 PROCEDURE — 99285 EMERGENCY DEPT VISIT HI MDM: CPT | Mod: GF

## 2022-06-02 PROCEDURE — 25000003 PHARM REV CODE 250

## 2022-06-02 PROCEDURE — 80053 COMPREHEN METABOLIC PANEL: CPT

## 2022-06-02 PROCEDURE — 85025 COMPLETE CBC W/AUTO DIFF WBC: CPT

## 2022-06-02 PROCEDURE — 81003 URINALYSIS AUTO W/O SCOPE: CPT | Mod: 59

## 2022-06-02 PROCEDURE — 96375 TX/PRO/DX INJ NEW DRUG ADDON: CPT

## 2022-06-02 RX ORDER — ONDANSETRON 4 MG/1
4 TABLET, FILM COATED ORAL EVERY 6 HOURS PRN
Qty: 8 TABLET | Refills: 0 | Status: SHIPPED | OUTPATIENT
Start: 2022-06-02 | End: 2022-06-04

## 2022-06-02 RX ORDER — ONDANSETRON 2 MG/ML
4 INJECTION INTRAMUSCULAR; INTRAVENOUS
Status: COMPLETED | OUTPATIENT
Start: 2022-06-02 | End: 2022-06-02

## 2022-06-02 RX ORDER — HYDROXYZINE PAMOATE 25 MG/1
25 CAPSULE ORAL EVERY 6 HOURS PRN
Qty: 12 CAPSULE | Refills: 0 | Status: SHIPPED | OUTPATIENT
Start: 2022-06-02 | End: 2022-06-05

## 2022-06-02 RX ORDER — LORAZEPAM 2 MG/ML
1 INJECTION INTRAMUSCULAR
Status: COMPLETED | OUTPATIENT
Start: 2022-06-02 | End: 2022-06-02

## 2022-06-02 RX ADMIN — ONDANSETRON 4 MG: 2 INJECTION INTRAMUSCULAR; INTRAVENOUS at 09:06

## 2022-06-02 RX ADMIN — SODIUM CHLORIDE 1000 ML: 9 INJECTION, SOLUTION INTRAVENOUS at 08:06

## 2022-06-02 RX ADMIN — LORAZEPAM 1 MG: 2 INJECTION INTRAMUSCULAR; INTRAVENOUS at 09:06

## 2022-06-02 NOTE — ED PROVIDER NOTES
Encounter Date: 6/2/2022       History     Chief Complaint   Patient presents with    Hallucinations    Panic Attack     65 yo female presents to ED with c/o generalized weakness, anxiety, and nausea. She denies any vomiting. She has no chest pain or shortness of breath. She reports symptom onset this morning. She also reports she started having hallucinations where she is hearing voices in her head this morning. She states the voices are not telling her to harm herself or anyone else. She has no suicidal ideation. She has PMH of hallucinations, bipolar, and schizophrenia. She has been out of her Risperdal for one week. She is currently taking ABX for UTI dx on 05/27/22. Unsure of name of ABX.     The history is provided by the patient and a relative.     Review of patient's allergies indicates:  No Known Allergies  Past Medical History:   Diagnosis Date    Bipolar disorder     GERD (gastroesophageal reflux disease)     Hypothyroidism     Schizophrenia, unspecified      Past Surgical History:   Procedure Laterality Date    HYSTERECTOMY      TONSILLECTOMY       History reviewed. No pertinent family history.  Social History     Tobacco Use    Smoking status: Never Smoker    Smokeless tobacco: Never Used   Substance Use Topics    Alcohol use: Never    Drug use: Never     Review of Systems   Constitutional: Negative for fever.   HENT: Negative for congestion and sore throat.    Respiratory: Negative for cough and shortness of breath.    Cardiovascular: Negative for chest pain, palpitations and leg swelling.   Gastrointestinal: Positive for nausea. Negative for abdominal pain, diarrhea and vomiting.   Genitourinary: Negative for dysuria.   Musculoskeletal: Negative for back pain.   Skin: Negative for rash.   Neurological: Positive for weakness (generalized). Negative for syncope and headaches.   Hematological: Does not bruise/bleed easily.   Psychiatric/Behavioral: Positive for hallucinations. Negative for  confusion. The patient is nervous/anxious.        Physical Exam     Initial Vitals [06/02/22 0845]   BP Pulse Resp Temp SpO2   (!) 83/51 60 20 98.1 °F (36.7 °C) 96 %      MAP       --         Physical Exam    Vitals reviewed.  Constitutional: She appears well-developed and well-nourished. No distress.   HENT:   Head: Normocephalic and atraumatic.   Mouth/Throat: Oropharynx is clear and moist.   Eyes: Conjunctivae and EOM are normal. Pupils are equal, round, and reactive to light.   Neck: Neck supple.   Normal range of motion.  Cardiovascular: Normal rate, regular rhythm, normal heart sounds and intact distal pulses.   Pulmonary/Chest: Breath sounds normal. No respiratory distress.   Abdominal: Abdomen is soft. Bowel sounds are normal. There is no abdominal tenderness.   Musculoskeletal:         General: No tenderness or edema. Normal range of motion.      Cervical back: Normal range of motion and neck supple.     Neurological: She is alert and oriented to person, place, and time. She has normal strength. No cranial nerve deficit.   Skin: Skin is warm and dry.   Psychiatric: Her speech is normal. Her mood appears anxious. She is not actively hallucinating. Thought content is not paranoid and not delusional. She expresses no homicidal and no suicidal ideation. She expresses no suicidal plans and no homicidal plans.         Medical Screening Exam   See Full Note    ED Course   Procedures  Labs Reviewed   COMPREHENSIVE METABOLIC PANEL - Abnormal; Notable for the following components:       Result Value    Sodium 128 (*)     Chloride 94 (*)     All other components within normal limits   CBC WITH DIFFERENTIAL - Abnormal; Notable for the following components:    Neutrophils % 73.4 (*)     Lymphocytes % 18.0 (*)     Monocytes % 6.7 (*)     All other components within normal limits   TROPONIN I - Normal   URINALYSIS - Normal   DRUG SCREEN, URINE (BEAKER) - Normal    Narrative:     The results of screening tests should be  considered presumptive. Confirmatory testing is available upon request.    Cutoff Points:  PCP:         25ng/mL  AMPH:        500ng/mL  MORAIMA:        200ng/mL  SILVANA:        200ng/mL  THC:         50ng/mL  PATRICIA:         300ng/mL  OPI:         2000ng/mL   CBC W/ AUTO DIFFERENTIAL    Narrative:     The following orders were created for panel order CBC auto differential.  Procedure                               Abnormality         Status                     ---------                               -----------         ------                     CBC with Differential[[id number]]        Abnormal            Final result                 Please view results for these tests on the individual orders.        ECG Results          EKG 12-lead (In process)  Result time 06/02/22 08:55:10    In process by Interface, Lab In [facility] (06/02/22 08:55:10)                 Narrative:    Test Reason : R53.1,    Vent. Rate : 091 BPM     Atrial Rate : 091 BPM     P-R Int : 138 ms          QRS Dur : 072 ms      QT Int : 356 ms       P-R-T Axes : 076 039 029 degrees     QTc Int : 437 ms    Program found technically poor ECG  Normal sinus rhythm  Nonspecific T wave abnormality  Abnormal ECG  No previous ECGs available    Referred By: [email or web address]   SELF           Confirmed By:                             Imaging Results          X-Ray Chest AP Portable (Final result)  Result time 06/02/22 08:56:43    Final result by Jayden Mcbride MD (06/02/22 08:56:43)                 Impression:      No acute cardiopulmonary process      Electronically signed by: Jayden Mcbride  Date:    06/02/2022  Time:    08:56             Narrative:    EXAMINATION:  XR CHEST AP PORTABLE    CLINICAL HISTORY:  .  Weakness.  GERD.  Hypothyroidism    COMPARISON:  No previous similar    TECHNIQUE:  Chest x-ray AP portable    FINDINGS:  The cardiac silhouette is not enlarged.  There is a small hiatal hernia.  There is no mediastinal mass.  There is no pulmonary vascular  engorgement.    Lungs are well expanded.  There is platelike scarring in the left mid lung medially.  There is no chris pneumonia.  There is no gross pleural effusion.    There is mild to moderate thoracic spondylosis                                 Medications   sodium chloride 0.9% bolus 1,000 mL (0 mLs Intravenous Stopped 6/2/22 0955)   ondansetron injection 4 mg (4 mg Intravenous Given 6/2/22 0942)   lorazepam injection 1 mg (1 mg Intravenous Given 6/2/22 0900)     Medical Decision Making:   ED Management:  Patient states she is feeling much better after IVF and medications given in ED and is ready to go home. She is able to ambulate w/o difficulty. She is here today with a neighbor, but I spoke with her sister on the phone. Patient lives at home with sister. Sister states patient has frequent episodes of hallucinations and she is followed by psychiatrist in [place], [place]. I discussed with sister PCP f/u for medication refill. She voiced understanding and is in agreement on discharge plan with patient returning to her home. She is working today, but has made arrangements for the neighbor to take the patient to a family member's home until she is off work so patient will not be alone. Patient is in agreement on this discharge plan as well.                   Clinical Impression:   Final diagnoses:  [R53.1] Weakness  [F41.9] Anxiety (Primary)  [R11.0] Nausea          ED Disposition Condition    Discharge Stable        ED Prescriptions     Medication Sig Dispense Start Date End Date Auth. Provider    hydrOXYzine pamoate (VISTARIL) 25 MG Cap Take 1 capsule (25 mg total) by mouth every 6 (six) hours as needed (anxiety). 12 capsule 6/2/2022 6/5/2022 JAYLAN Wyman    ondansetron (ZOFRAN) 4 MG tablet Take 1 tablet (4 mg total) by mouth every 6 (six) hours as needed for Nausea. 8 tablet 6/2/2022 6/4/2022 JAYLAN Wyman        Follow-up Information     Follow up With Specialties Details Why Contact Shannon THOMAS  [facility] On 6/6/2022  [address] [facility]  [facility] [place] [address]  [phone]             JAYLAN Wyman  06/02/22 2035

## 2022-06-02 NOTE — DISCHARGE INSTRUCTIONS
Follow-up with your primary care provider as discussed. Return to Emergency Department for any new or worsening symptoms.

## 2022-08-27 ENCOUNTER — HOSPITAL ENCOUNTER (EMERGENCY)
Facility: HOSPITAL | Age: 67
Discharge: OTHER INSTITUTION | End: 2022-08-27
Payer: MEDICARE

## 2022-08-27 VITALS
RESPIRATION RATE: 18 BRPM | HEART RATE: 87 BPM | HEIGHT: 60 IN | SYSTOLIC BLOOD PRESSURE: 141 MMHG | BODY MASS INDEX: 25.52 KG/M2 | OXYGEN SATURATION: 96 % | DIASTOLIC BLOOD PRESSURE: 62 MMHG | TEMPERATURE: 99 F | WEIGHT: 130 LBS

## 2022-08-27 DIAGNOSIS — R44.3 HALLUCINATIONS: Primary | ICD-10-CM

## 2022-08-27 DIAGNOSIS — F41.9 ANXIETY: ICD-10-CM

## 2022-08-27 LAB
ALBUMIN SERPL BCP-MCNC: 3.8 G/DL (ref 3.5–5)
ALBUMIN/GLOB SERPL: 1.4 {RATIO}
ALP SERPL-CCNC: 66 U/L (ref 55–142)
ALT SERPL W P-5'-P-CCNC: 23 U/L (ref 13–56)
AMPHET UR QL SCN: NEGATIVE
ANION GAP SERPL CALCULATED.3IONS-SCNC: 13 MMOL/L (ref 7–16)
APAP SERPL-MCNC: <2 ΜG/ML (ref 10–30)
AST SERPL W P-5'-P-CCNC: 19 U/L (ref 15–37)
BARBITURATES UR QL SCN: NEGATIVE
BASOPHILS # BLD AUTO: 0.02 K/UL (ref 0–0.2)
BASOPHILS NFR BLD AUTO: 0.2 % (ref 0–1)
BENZODIAZ METAB UR QL SCN: NEGATIVE
BILIRUB SERPL-MCNC: 0.3 MG/DL (ref ?–1.2)
BILIRUB UR QL STRIP: NEGATIVE
BUN SERPL-MCNC: 7 MG/DL (ref 7–18)
BUN/CREAT SERPL: 8 (ref 6–20)
CALCIUM SERPL-MCNC: 8.7 MG/DL (ref 8.5–10.1)
CANNABINOIDS UR QL SCN: NEGATIVE
CHLORIDE SERPL-SCNC: 97 MMOL/L (ref 98–107)
CLARITY UR: CLEAR
CO2 SERPL-SCNC: 26 MMOL/L (ref 21–32)
COCAINE UR QL SCN: NEGATIVE
COLOR UR: YELLOW
CREAT SERPL-MCNC: 0.83 MG/DL (ref 0.55–1.02)
DIFFERENTIAL METHOD BLD: ABNORMAL
EGFR (NO RACE VARIABLE) (RUSH/TITUS): 76 ML/MIN/1.73M²
EOSINOPHIL # BLD AUTO: 0.06 K/UL (ref 0–0.5)
EOSINOPHIL NFR BLD AUTO: 0.6 % (ref 1–4)
ERYTHROCYTE [DISTWIDTH] IN BLOOD BY AUTOMATED COUNT: 13.3 % (ref 11.5–14.5)
ETHANOL, BLOOD (CATEGORY): NOT DETECTED
GLOBULIN SER-MCNC: 2.8 G/DL (ref 2–4)
GLUCOSE SERPL-MCNC: 109 MG/DL (ref 74–106)
GLUCOSE UR STRIP-MCNC: NEGATIVE MG/DL
HCT VFR BLD AUTO: 37.4 % (ref 38–47)
HGB BLD-MCNC: 13.2 G/DL (ref 12–16)
KETONES UR STRIP-SCNC: NEGATIVE MG/DL
LEUKOCYTE ESTERASE UR QL STRIP: ABNORMAL
LYMPHOCYTES # BLD AUTO: 1.63 K/UL (ref 1–4.8)
LYMPHOCYTES NFR BLD AUTO: 16.4 % (ref 27–41)
MCH RBC QN AUTO: 28.1 PG (ref 27–31)
MCHC RBC AUTO-ENTMCNC: 35.3 G/DL (ref 32–36)
MCV RBC AUTO: 79.6 FL (ref 80–96)
MONOCYTES # BLD AUTO: 0.4 K/UL (ref 0–0.8)
MONOCYTES NFR BLD AUTO: 4 % (ref 2–6)
MPC BLD CALC-MCNC: 11.1 FL (ref 9.4–12.4)
NEUTROPHILS # BLD AUTO: 7.84 K/UL (ref 1.8–7.7)
NEUTROPHILS NFR BLD AUTO: 78.8 % (ref 53–65)
NITRITE UR QL STRIP: NEGATIVE
OPIATES UR QL SCN: NEGATIVE
PCP UR QL SCN: NEGATIVE
PH UR STRIP: 6.5 PH UNITS
PLATELET # BLD AUTO: 220 K/UL (ref 150–400)
POTASSIUM SERPL-SCNC: 4.2 MMOL/L (ref 3.5–5.1)
PROT SERPL-MCNC: 6.6 G/DL (ref 6.4–8.2)
PROT UR QL STRIP: NEGATIVE
RBC # BLD AUTO: 4.7 M/UL (ref 4.2–5.4)
RBC # UR STRIP: NEGATIVE /UL
SALICYLATES SERPL-MCNC: 0.9 MG/DL (ref 3–30)
SARS-COV+SARS-COV-2 AG RESP QL IA.RAPID: NEGATIVE
SODIUM SERPL-SCNC: 132 MMOL/L (ref 136–145)
SP GR UR STRIP: 1.01
SQUAMOUS #/AREA URNS LPF: NORMAL /LPF
UROBILINOGEN UR STRIP-ACNC: 0.2 MG/DL
WBC # BLD AUTO: 9.95 K/UL (ref 4.5–11)
WBC #/AREA URNS HPF: NORMAL /HPF

## 2022-08-27 PROCEDURE — 82077 ASSAY SPEC XCP UR&BREATH IA: CPT

## 2022-08-27 PROCEDURE — 84443 ASSAY THYROID STIM HORMONE: CPT

## 2022-08-27 PROCEDURE — 99284 EMERGENCY DEPT VISIT MOD MDM: CPT | Mod: GF

## 2022-08-27 PROCEDURE — 81001 URINALYSIS AUTO W/SCOPE: CPT | Mod: 59

## 2022-08-27 PROCEDURE — 80053 COMPREHEN METABOLIC PANEL: CPT

## 2022-08-27 PROCEDURE — 25000003 PHARM REV CODE 250

## 2022-08-27 PROCEDURE — 99285 EMERGENCY DEPT VISIT HI MDM: CPT | Mod: CS

## 2022-08-27 PROCEDURE — 36415 COLL VENOUS BLD VENIPUNCTURE: CPT

## 2022-08-27 PROCEDURE — 80307 DRUG TEST PRSMV CHEM ANLYZR: CPT

## 2022-08-27 PROCEDURE — 80143 DRUG ASSAY ACETAMINOPHEN: CPT

## 2022-08-27 PROCEDURE — 80179 DRUG ASSAY SALICYLATE: CPT

## 2022-08-27 PROCEDURE — 85025 COMPLETE CBC W/AUTO DIFF WBC: CPT

## 2022-08-27 PROCEDURE — 87426 SARSCOV CORONAVIRUS AG IA: CPT

## 2022-08-27 RX ORDER — HYDROXYZINE PAMOATE 25 MG/1
25 CAPSULE ORAL
Status: COMPLETED | OUTPATIENT
Start: 2022-08-27 | End: 2022-08-27

## 2022-08-27 RX ADMIN — HYDROXYZINE PAMOATE 25 MG: 25 CAPSULE ORAL at 11:08

## 2022-08-27 NOTE — ED PROVIDER NOTES
Encounter Date: 8/27/2022       History     Chief Complaint   Patient presents with    Anxiety     Started this am     66 yo female with PMH of Bipolar and Schizophrenia presents to ED with c/o anxiety and hallucinations. States symptoms began this morning when she woke up. She is hearing voices that are telling her to die; states they are telling her to use weapons to harm herself. Denies any SI/HI. States she has never tried to kill herself and denies any plan to harm herself outside of the voices telling her to do so. States she has been off of her medications for one week. She presents POV.    The history is provided by the patient.   Review of patient's allergies indicates:  No Known Allergies  Past Medical History:   Diagnosis Date    Bipolar disorder     GERD (gastroesophageal reflux disease)     Hypothyroidism     Schizophrenia, unspecified      Past Surgical History:   Procedure Laterality Date    HYSTERECTOMY      TONSILLECTOMY       No family history on file.  Social History     Tobacco Use    Smoking status: Never    Smokeless tobacco: Never   Substance Use Topics    Alcohol use: Never    Drug use: Never     Review of Systems   Constitutional:  Negative for fever.   HENT:  Negative for sore throat.    Respiratory:  Negative for shortness of breath.    Cardiovascular:  Negative for chest pain.   Gastrointestinal:  Negative for nausea.   Genitourinary:  Negative for dysuria.   Musculoskeletal:  Negative for back pain.   Skin:  Negative for rash.   Neurological:  Negative for weakness.   Hematological:  Does not bruise/bleed easily.   Psychiatric/Behavioral:  Positive for hallucinations. The patient is nervous/anxious.      Physical Exam     Initial Vitals   BP Pulse Resp Temp SpO2   08/27/22 1047 08/27/22 1052 08/27/22 1052 08/27/22 1052 08/27/22 1052   (!) 140/54 91 20 98.8 °F (37.1 °C) 95 %      MAP       --                Physical Exam    Vitals reviewed.  Constitutional: She appears well-developed and  well-nourished.   HENT:   Head: Normocephalic and atraumatic.   Eyes: Pupils are equal, round, and reactive to light.   Neck:   Normal range of motion.  Cardiovascular:  Normal rate, regular rhythm, normal heart sounds and intact distal pulses.           Pulmonary/Chest: Breath sounds normal. No respiratory distress.   Abdominal: Abdomen is soft. Bowel sounds are normal. There is no abdominal tenderness.   Musculoskeletal:         General: No tenderness or edema. Normal range of motion.      Cervical back: Normal range of motion.     Neurological: She is alert and oriented to person, place, and time.   Skin: Skin is warm and dry.   Psychiatric: Her mood appears anxious. She is actively hallucinating.       Medical Screening Exam   See Full Note    ED Course   Procedures  Labs Reviewed   COMPREHENSIVE METABOLIC PANEL - Abnormal; Notable for the following components:       Result Value    Sodium 132 (*)     Chloride 97 (*)     Glucose 109 (*)     All other components within normal limits   URINALYSIS, REFLEX TO URINE CULTURE - Abnormal; Notable for the following components:    Leukocytes, UA Trace (*)     All other components within normal limits   ACETAMINOPHEN LEVEL - Abnormal; Notable for the following components:    Acetaminophen <2 (*)     All other components within normal limits   SALICYLATE LEVEL - Abnormal; Notable for the following components:    Salicylate 0.9 (*)     All other components within normal limits   CBC WITH DIFFERENTIAL - Abnormal; Notable for the following components:    Hematocrit 37.4 (*)     MCV 79.6 (*)     Neutrophils % 78.8 (*)     Lymphocytes % 16.4 (*)     Neutrophils, Abs 7.84 (*)     Eosinophils % 0.6 (*)     All other components within normal limits   DRUG SCREEN, URINE (BEAKER) - Normal    Narrative:     The results of screening tests should be considered presumptive. Confirmatory testing is available upon request.    Cutoff Points:  PCP:         25ng/mL  AMPH:         500ng/mL  MORAIMA:        200ng/mL  SILVANA:        200ng/mL  THC:         50ng/mL  PATRICIA:         300ng/mL  OPI:         2000ng/mL   SARS ANTIGEN(WILFREDO) - Normal    Narrative:     Negative SARS-CoV results should not be used as the sole basis for treatment or patient management decisions; negative results should be considered in the context of a patient's recent exposures, history and the presene of clinical signs and symptoms consistent with COVID-19.  Negative results should be treated as presumptive and confirmed by molecular assay, if necessary for patient management.   URINALYSIS, MICROSCOPIC - Normal   CBC W/ AUTO DIFFERENTIAL    Narrative:     The following orders were created for panel order CBC auto differential.  Procedure                               Abnormality         Status                     ---------                               -----------         ------                     CBC with Differential[[id number]]        Abnormal            Final result                 Please view results for these tests on the individual orders.   ALCOHOL,MEDICAL (ETHANOL)   TSH          Imaging Results    None          Medications   hydrOXYzine pamoate capsule 25 mg (25 mg Oral Given 8/27/22 1109)                       Clinical Impression:   Final diagnoses:  [F41.9] Anxiety  [R44.3] Hallucinations (Primary)      ED Disposition Condition    Transfer to Another Facility Stable                JAYLAN Wyman  08/27/22 1200       JAYLAN Wyman  08/27/22 1343

## 2022-08-28 LAB — TSH SERPL DL<=0.005 MIU/L-ACNC: 2.53 UIU/ML (ref 0.36–3.74)

## 2022-11-08 ENCOUNTER — OFFICE VISIT (OUTPATIENT)
Dept: FAMILY MEDICINE | Facility: CLINIC | Age: 67
End: 2022-11-08
Payer: MEDICARE

## 2022-11-08 VITALS
HEART RATE: 109 BPM | TEMPERATURE: 98 F | OXYGEN SATURATION: 96 % | SYSTOLIC BLOOD PRESSURE: 117 MMHG | BODY MASS INDEX: 24.74 KG/M2 | HEIGHT: 60 IN | WEIGHT: 126 LBS | DIASTOLIC BLOOD PRESSURE: 76 MMHG

## 2022-11-08 DIAGNOSIS — Z00.00 ROUTINE GENERAL MEDICAL EXAMINATION AT A HEALTH CARE FACILITY: Primary | ICD-10-CM

## 2022-11-08 DIAGNOSIS — Z79.899 HIGH RISK MEDICATION USE: ICD-10-CM

## 2022-11-08 DIAGNOSIS — K21.9 GASTROESOPHAGEAL REFLUX DISEASE, UNSPECIFIED WHETHER ESOPHAGITIS PRESENT: ICD-10-CM

## 2022-11-08 DIAGNOSIS — E03.9 HYPOTHYROIDISM, UNSPECIFIED TYPE: ICD-10-CM

## 2022-11-08 DIAGNOSIS — R35.0 URINARY FREQUENCY: ICD-10-CM

## 2022-11-08 DIAGNOSIS — Z11.1 SCREENING FOR TUBERCULOSIS: ICD-10-CM

## 2022-11-08 DIAGNOSIS — F20.9 SCHIZOPHRENIA, UNSPECIFIED TYPE: ICD-10-CM

## 2022-11-08 LAB
BILIRUB SERPL-MCNC: NORMAL MG/DL
BLOOD URINE, POC: NORMAL
COLOR, POC UA: YELLOW
GLUCOSE UR QL STRIP: NORMAL
KETONES UR QL STRIP: NORMAL
LEUKOCYTE ESTERASE URINE, POC: NORMAL
NITRITE, POC UA: NORMAL
PH, POC UA: 6.5
PROTEIN, POC: NORMAL
SPECIFIC GRAVITY, POC UA: 1.01
UROBILINOGEN, POC UA: 0.2

## 2022-11-08 PROCEDURE — 99213 OFFICE O/P EST LOW 20 MIN: CPT | Mod: ,,, | Performed by: NURSE PRACTITIONER

## 2022-11-08 PROCEDURE — 81003 POCT URINALYSIS W/O SCOPE: ICD-10-PCS | Mod: QW,,, | Performed by: NURSE PRACTITIONER

## 2022-11-08 PROCEDURE — 3074F SYST BP LT 130 MM HG: CPT | Mod: ,,, | Performed by: NURSE PRACTITIONER

## 2022-11-08 PROCEDURE — 99213 PR OFFICE/OUTPT VISIT, EST, LEVL III, 20-29 MIN: ICD-10-PCS | Mod: ,,, | Performed by: NURSE PRACTITIONER

## 2022-11-08 PROCEDURE — 1160F RVW MEDS BY RX/DR IN RCRD: CPT | Mod: ,,, | Performed by: NURSE PRACTITIONER

## 2022-11-08 PROCEDURE — 1126F AMNT PAIN NOTED NONE PRSNT: CPT | Mod: ,,, | Performed by: NURSE PRACTITIONER

## 2022-11-08 PROCEDURE — 80053 COMPREHENSIVE METABOLIC PANEL: ICD-10-PCS | Mod: ,,, | Performed by: CLINICAL MEDICAL LABORATORY

## 2022-11-08 PROCEDURE — 3078F PR MOST RECENT DIASTOLIC BLOOD PRESSURE < 80 MM HG: ICD-10-PCS | Mod: ,,, | Performed by: NURSE PRACTITIONER

## 2022-11-08 PROCEDURE — 81003 URINALYSIS AUTO W/O SCOPE: CPT | Mod: QW,,, | Performed by: NURSE PRACTITIONER

## 2022-11-08 PROCEDURE — 1160F PR REVIEW ALL MEDS BY PRESCRIBER/CLIN PHARMACIST DOCUMENTED: ICD-10-PCS | Mod: ,,, | Performed by: NURSE PRACTITIONER

## 2022-11-08 PROCEDURE — 3008F BODY MASS INDEX DOCD: CPT | Mod: ,,, | Performed by: NURSE PRACTITIONER

## 2022-11-08 PROCEDURE — 3078F DIAST BP <80 MM HG: CPT | Mod: ,,, | Performed by: NURSE PRACTITIONER

## 2022-11-08 PROCEDURE — 85025 CBC WITH DIFFERENTIAL: ICD-10-PCS | Mod: ,,, | Performed by: CLINICAL MEDICAL LABORATORY

## 2022-11-08 PROCEDURE — 3074F PR MOST RECENT SYSTOLIC BLOOD PRESSURE < 130 MM HG: ICD-10-PCS | Mod: ,,, | Performed by: NURSE PRACTITIONER

## 2022-11-08 PROCEDURE — 80053 COMPREHEN METABOLIC PANEL: CPT | Mod: ,,, | Performed by: CLINICAL MEDICAL LABORATORY

## 2022-11-08 PROCEDURE — 1126F PR PAIN SEVERITY QUANTIFIED, NO PAIN PRESENT: ICD-10-PCS | Mod: ,,, | Performed by: NURSE PRACTITIONER

## 2022-11-08 PROCEDURE — 3008F PR BODY MASS INDEX (BMI) DOCUMENTED: ICD-10-PCS | Mod: ,,, | Performed by: NURSE PRACTITIONER

## 2022-11-08 PROCEDURE — 1159F PR MEDICATION LIST DOCUMENTED IN MEDICAL RECORD: ICD-10-PCS | Mod: ,,, | Performed by: NURSE PRACTITIONER

## 2022-11-08 PROCEDURE — 85025 COMPLETE CBC W/AUTO DIFF WBC: CPT | Mod: ,,, | Performed by: CLINICAL MEDICAL LABORATORY

## 2022-11-08 PROCEDURE — 1159F MED LIST DOCD IN RCRD: CPT | Mod: ,,, | Performed by: NURSE PRACTITIONER

## 2022-11-08 RX ORDER — OMEPRAZOLE 40 MG/1
40 CAPSULE, DELAYED RELEASE ORAL DAILY
COMMUNITY
End: 2023-10-12 | Stop reason: SDUPTHER

## 2022-11-08 RX ORDER — TRAZODONE HYDROCHLORIDE 100 MG/1
100 TABLET ORAL NIGHTLY
COMMUNITY
Start: 2022-10-14

## 2022-11-08 RX ORDER — RESVER/WINE/BFL/GRPSD/PC/C/POM 200MG-60MG
1 CAPSULE ORAL DAILY
COMMUNITY
Start: 2022-10-14

## 2022-11-08 RX ORDER — QUETIAPINE FUMARATE 200 MG/1
200 TABLET, FILM COATED ORAL 2 TIMES DAILY
COMMUNITY

## 2022-11-08 RX ORDER — TRIHEXYPHENIDYL HYDROCHLORIDE 2 MG/1
2 TABLET ORAL 2 TIMES DAILY
COMMUNITY
Start: 2022-10-14

## 2022-11-08 RX ORDER — HYDROXYZINE PAMOATE 25 MG/1
25 CAPSULE ORAL 2 TIMES DAILY PRN
COMMUNITY
Start: 2022-10-14

## 2022-11-08 RX ORDER — BUSPIRONE HYDROCHLORIDE 10 MG/1
10 TABLET ORAL 2 TIMES DAILY
COMMUNITY
Start: 2022-10-14

## 2022-11-08 RX ORDER — MULTIVITAMIN/IRON/FOLIC ACID 18MG-0.4MG
TABLET ORAL DAILY
COMMUNITY
Start: 2022-06-08

## 2022-11-08 RX ORDER — LEVOTHYROXINE SODIUM 125 UG/1
125 TABLET ORAL EVERY MORNING
COMMUNITY
Start: 2022-08-25 | End: 2023-10-16 | Stop reason: DRUGHIGH

## 2022-11-08 NOTE — PROGRESS NOTES
"Clinic note     Patient name: Felicia Grijalva is a 67 y.o. female   Chief compliant   Chief Complaint   Patient presents with    Referral     Here for a referral to [facility].        Subjective     History of present illness   In clinic for routine physical examination in preparation for admission to [facility]   She denies any acute concerns at present   Will obtain routine lab, urinalysis and chest xray today in clinic   She states she had TB skin test at [facility] a few months ago and she will obtain copy of this for [facility]   She has not had COVID vaccine, she declines COVID vaccine and influenza vaccine at this time   Past Medical History: hypothyroidism, GERD, schizophrenia and bipolar disorder  She had psychiatric admission in August, medications were changed during admission, She admits she continues to hear "some voices" but states current medications are "much better"  Denies any SI/HI    Sister present during office visit         Social History     Tobacco Use    Smoking status: Never    Smokeless tobacco: Never   Substance Use Topics    Alcohol use: Never    Drug use: Never       Review of patient's allergies indicates:  No Known Allergies    Past Medical History:   Diagnosis Date    Bipolar disorder     GERD (gastroesophageal reflux disease)     Hypothyroidism     Schizophrenia, unspecified        Past Surgical History:   Procedure Laterality Date    HYSTERECTOMY      TONSILLECTOMY          History reviewed. No pertinent family history.      Current Outpatient Medications:     busPIRone (BUSPAR) 10 MG tablet, Take 10 mg by mouth 2 (two) times a day., Disp: , Rfl:     CERTAVITE-ANTIOXIDANT Tab, Take by mouth Daily., Disp: , Rfl:     levothyroxine (SYNTHROID) 125 MCG tablet, Take 125 mcg by mouth every morning., Disp: , Rfl:     omeprazole (PRILOSEC) 40 MG capsule, Take 40 mg by mouth once daily., Disp: , Rfl:     QUEtiapine (SEROQUEL) 200 MG Tab, Take 200 mg by mouth 2 (two) " times daily., Disp: , Rfl:     risperiDONE (RISPERDAL) 2 MG tablet, Take 2 mg by mouth 2 (two) times a day., Disp: , Rfl:     traZODone (DESYREL) 100 MG tablet, Take 100 mg by mouth every evening., Disp: , Rfl:     trihexyphenidyL (ARTANE) 2 MG tablet, Take 2 mg by mouth 2 (two) times daily., Disp: , Rfl:     VISTARIL 25 mg Cap, Take 25 mg by mouth 2 (two) times daily as needed., Disp: , Rfl:     VITAMIN D3 125 mcg (5,000 unit) Tab, Take 1 tablet by mouth once daily., Disp: , Rfl:     levothyroxine (SYNTHROID) 75 MCG tablet, Take 1 tablet (75 mcg total) by mouth once daily. (Patient not taking: Reported on 11/8/2022), Disp: 30 tablet, Rfl: 5    pantoprazole (PROTONIX) 40 MG tablet, Take 40 mg by mouth once daily., Disp: , Rfl:     Review of Systems   Constitutional:  Negative for appetite change, chills, fatigue, fever and unexpected weight change.   Eyes:  Negative for visual disturbance.   Respiratory:  Negative for cough and shortness of breath.    Cardiovascular:  Negative for chest pain, palpitations and leg swelling.   Gastrointestinal:  Negative for abdominal pain, change in bowel habit, constipation, diarrhea, nausea, vomiting and change in bowel habit.   Endocrine: Negative for polydipsia and polyuria.   Genitourinary:  Negative for dysuria, frequency, hematuria and urgency.   Musculoskeletal:  Negative for arthralgias, gait problem and myalgias.   Integumentary:  Negative for rash and wound.   Neurological:  Negative for dizziness, syncope, light-headedness and headaches.   Psychiatric/Behavioral:  Positive for hallucinations. Negative for confusion, dysphoric mood, sleep disturbance and suicidal ideas. The patient is not nervous/anxious.      Objective     /76   Pulse 109   Temp 97.5 °F (36.4 °C)   Ht 5' (1.524 m)   Wt 57.2 kg (126 lb)   SpO2 96%   BMI 24.61 kg/m²     Physical Exam   Constitutional: She is oriented to person, place, and time. She is cooperative. No distress.   HENT:   Head:  Atraumatic.   Mouth/Throat: Mucous membranes are moist.   Eyes: Pupils are equal, round, and reactive to light. Conjunctivae are normal.   Cardiovascular: Normal rate and regular rhythm. Pulmonary:      Effort: Pulmonary effort is normal. No respiratory distress.      Breath sounds: Normal breath sounds. No wheezing, rhonchi or rales.     Abdominal: Soft. Bowel sounds are normal. She exhibits no distension. There is no abdominal tenderness.   Musculoskeletal:         General: Normal range of motion.      Cervical back: Neck supple.      Right lower leg: No edema.      Left lower leg: No edema.   Neurological: She is alert and oriented to person, place, and time. Gait normal.   Skin: Skin is warm and dry.   Psychiatric: Her speech is normal and behavior is normal. Mood and affect normal. Thought content is not paranoid. She expresses no homicidal and no suicidal ideation. She expresses no suicidal plans and no homicidal plans.   Appears to be responding to auditory hallucinations   Narrative & Impression  EXAMINATION:  XR CHEST PA AND LATERAL     CLINICAL HISTORY:  Encounter for screening for respiratory tuberculosis     TECHNIQUE:  PA and lateral views of the chest were performed.     COMPARISON:  06/02/2022     FINDINGS:  Heart size normal.  The previously seen atelectatic change in the left mid lung is no longer present.  Lungs are clear.  No pneumothorax.  No pleural effusion.     Impression:     No acute findings.        Electronically signed by: Zaki Dotson  Date:                                            11/08/2022  Time:                                           14:54           Exam Ended: 11/08/22 14:52           Lab Results   Component Value Date    WBC 9.89 08/28/2022    HGB 13.5 08/28/2022    HCT 39.3 08/28/2022    MCV 79.9 (L) 08/28/2022     08/28/2022       CMP  Sodium   Date Value Ref Range Status   09/03/2022 130 (L) 136 - 145 mmol/L Final     Potassium   Date Value Ref Range Status   09/03/2022  4.0 3.5 - 5.1 mmol/L Final     Chloride   Date Value Ref Range Status   09/03/2022 96 (L) 98 - 107 mmol/L Final     CO2   Date Value Ref Range Status   09/03/2022 26 21 - 32 mmol/L Final     Glucose   Date Value Ref Range Status   09/03/2022 133 (H) 74 - 106 mg/dL Final     BUN   Date Value Ref Range Status   09/03/2022 5 (L) 7 - 18 mg/dL Final     Creatinine   Date Value Ref Range Status   09/03/2022 0.84 0.55 - 1.02 mg/dL Final     Calcium   Date Value Ref Range Status   09/03/2022 9.1 8.5 - 10.1 mg/dL Final     Total Protein   Date Value Ref Range Status   08/28/2022 6.3 (L) 6.4 - 8.2 g/dL Final     Albumin   Date Value Ref Range Status   08/28/2022 3.8 3.5 - 5.0 g/dL Final     Bilirubin, Total   Date Value Ref Range Status   08/28/2022 0.6 >0.0 - 1.2 mg/dL Final     Alk Phos   Date Value Ref Range Status   08/28/2022 76 55 - 142 U/L Final     AST   Date Value Ref Range Status   08/28/2022 23 15 - 37 U/L Final     ALT   Date Value Ref Range Status   08/28/2022 21 13 - 56 U/L Final     Anion Gap   Date Value Ref Range Status   09/03/2022 12 7 - 16 mmol/L Final     eGFR   Date Value Ref Range Status   06/02/2022 60 >=60 mL/min/1.73m² Final     Lab Results   Component Value Date    TSH 4.400 (H) 08/28/2022     Lab Results   Component Value Date    CHOL 164 08/28/2022     Lab Results   Component Value Date    HDL 48 08/28/2022     Lab Results   Component Value Date    LDLCALC 76 08/28/2022     Lab Results   Component Value Date    TRIG 198 (H) 08/28/2022     Lab Results   Component Value Date    CHOLHDL 3.4 08/28/2022     Component 2 d ago    Color, UA Yellow    Spec Grav UA 1.010    pH, UA 6.5    WBC, UA small    Nitrite, UA neg    Protein, POC neg    Glucose, UA neg    Ketones, UA neg    Bilirubin, POC neg    Urobilinogen, UA 0.2    Blood, UA neg               Specimen Collected: 11/08/22 14:43           Lab Results   Component Value Date    WBC 6.20 11/08/2022    HGB 13.5 11/08/2022    HCT 41.7 11/08/2022    MCV  86.5 11/08/2022     11/08/2022     CMP  Sodium   Date Value Ref Range Status   11/08/2022 138 136 - 145 mmol/L Final     Potassium   Date Value Ref Range Status   11/08/2022 3.8 3.5 - 5.1 mmol/L Final     Chloride   Date Value Ref Range Status   11/08/2022 106 98 - 107 mmol/L Final     CO2   Date Value Ref Range Status   11/08/2022 24 21 - 32 mmol/L Final     Glucose   Date Value Ref Range Status   11/08/2022 101 74 - 106 mg/dL Final     BUN   Date Value Ref Range Status   11/08/2022 6 (L) 7 - 18 mg/dL Final     Creatinine   Date Value Ref Range Status   11/08/2022 0.82 0.55 - 1.02 mg/dL Final     Calcium   Date Value Ref Range Status   11/08/2022 8.6 8.5 - 10.1 mg/dL Final     Total Protein   Date Value Ref Range Status   11/08/2022 6.5 6.4 - 8.2 g/dL Final     Albumin   Date Value Ref Range Status   11/08/2022 4.0 3.5 - 5.0 g/dL Final     Bilirubin, Total   Date Value Ref Range Status   11/08/2022 0.2 >0.0 - 1.2 mg/dL Final     Alk Phos   Date Value Ref Range Status   11/08/2022 82 55 - 142 U/L Final     AST   Date Value Ref Range Status   11/08/2022 29 15 - 37 U/L Final     ALT   Date Value Ref Range Status   11/08/2022 36 13 - 56 U/L Final     Anion Gap   Date Value Ref Range Status   11/08/2022 12 7 - 16 mmol/L Final     eGFR   Date Value Ref Range Status   11/08/2022 78 >=60 mL/min/1.73m² Final       No results found for: LABA1C, HGBA1C      Assessment and Plan   Routine general medical examination at a health care facility    Schizophrenia, unspecified type  -     CBC Auto Differential; Future; Expected date: 11/08/2022  -     Comprehensive Metabolic Panel; Future; Expected date: 11/08/2022    Hypothyroidism, unspecified type    Gastroesophageal reflux disease, unspecified whether esophagitis present    High risk medication use  -     CBC Auto Differential; Future; Expected date: 11/08/2022  -     Comprehensive Metabolic Panel; Future; Expected date: 11/08/2022    Screening for tuberculosis  -      X-Ray Chest PA And Lateral; Future; Expected date: 11/08/2022    Urinary frequency  -     POCT URINALYSIS W/O SCOPE        Patient Instructions  Patient Instructions   Lab obtained in clinic today, we will notify you of results and any necessary changes to plan of care   Chest xray obtained in clinic will notify of results when available     We will forward my clinic h&p, current medication list, lab results and results of chest xray to [employer] attention Lisa Fermin, admission [phone]

## 2022-11-09 DIAGNOSIS — Z71.89 COMPLEX CARE COORDINATION: ICD-10-CM

## 2022-11-09 LAB
ALBUMIN SERPL BCP-MCNC: 4 G/DL (ref 3.5–5)
ALBUMIN/GLOB SERPL: 1.6 {RATIO}
ALP SERPL-CCNC: 82 U/L (ref 55–142)
ALT SERPL W P-5'-P-CCNC: 36 U/L (ref 13–56)
ANION GAP SERPL CALCULATED.3IONS-SCNC: 12 MMOL/L (ref 7–16)
AST SERPL W P-5'-P-CCNC: 29 U/L (ref 15–37)
BASOPHILS # BLD AUTO: 0.03 K/UL (ref 0–0.2)
BASOPHILS NFR BLD AUTO: 0.5 % (ref 0–1)
BILIRUB SERPL-MCNC: 0.2 MG/DL (ref ?–1.2)
BUN SERPL-MCNC: 6 MG/DL (ref 7–18)
BUN/CREAT SERPL: 7 (ref 6–20)
CALCIUM SERPL-MCNC: 8.6 MG/DL (ref 8.5–10.1)
CHLORIDE SERPL-SCNC: 106 MMOL/L (ref 98–107)
CO2 SERPL-SCNC: 24 MMOL/L (ref 21–32)
CREAT SERPL-MCNC: 0.82 MG/DL (ref 0.55–1.02)
DIFFERENTIAL METHOD BLD: ABNORMAL
EGFR (NO RACE VARIABLE) (RUSH/TITUS): 78 ML/MIN/1.73M²
EOSINOPHIL # BLD AUTO: 0.1 K/UL (ref 0–0.5)
EOSINOPHIL NFR BLD AUTO: 1.6 % (ref 1–4)
ERYTHROCYTE [DISTWIDTH] IN BLOOD BY AUTOMATED COUNT: 14.4 % (ref 11.5–14.5)
GLOBULIN SER-MCNC: 2.5 G/DL (ref 2–4)
GLUCOSE SERPL-MCNC: 101 MG/DL (ref 74–106)
HCT VFR BLD AUTO: 41.7 % (ref 38–47)
HGB BLD-MCNC: 13.5 G/DL (ref 12–16)
IMM GRANULOCYTES # BLD AUTO: 0.04 K/UL (ref 0–0.04)
IMM GRANULOCYTES NFR BLD: 0.6 % (ref 0–0.4)
LYMPHOCYTES # BLD AUTO: 1.72 K/UL (ref 1–4.8)
LYMPHOCYTES NFR BLD AUTO: 27.7 % (ref 27–41)
MCH RBC QN AUTO: 28 PG (ref 27–31)
MCHC RBC AUTO-ENTMCNC: 32.4 G/DL (ref 32–36)
MCV RBC AUTO: 86.5 FL (ref 80–96)
MONOCYTES # BLD AUTO: 0.38 K/UL (ref 0–0.8)
MONOCYTES NFR BLD AUTO: 6.1 % (ref 2–6)
MPC BLD CALC-MCNC: 14 FL (ref 9.4–12.4)
NEUTROPHILS # BLD AUTO: 3.93 K/UL (ref 1.8–7.7)
NEUTROPHILS NFR BLD AUTO: 63.5 % (ref 53–65)
NRBC # BLD AUTO: 0 X10E3/UL
NRBC, AUTO (.00): 0 %
PLATELET # BLD AUTO: 206 K/UL (ref 150–400)
PLATELET MORPHOLOGY: ABNORMAL
POTASSIUM SERPL-SCNC: 3.8 MMOL/L (ref 3.5–5.1)
PROT SERPL-MCNC: 6.5 G/DL (ref 6.4–8.2)
RBC # BLD AUTO: 4.82 M/UL (ref 4.2–5.4)
RBC MORPH BLD: NORMAL
SODIUM SERPL-SCNC: 138 MMOL/L (ref 136–145)
WBC # BLD AUTO: 6.2 K/UL (ref 4.5–11)

## 2023-06-09 DIAGNOSIS — Z71.89 COMPLEX CARE COORDINATION: ICD-10-CM

## 2023-06-16 ENCOUNTER — PATIENT OUTREACH (OUTPATIENT)
Dept: ADMINISTRATIVE | Facility: HOSPITAL | Age: 68
End: 2023-06-16

## 2023-06-16 NOTE — PROGRESS NOTES
06/16/2023   --Chart accessed for: [employer] Report  --Care Gaps addressed: Mammogram  Outreach made to patient via N/A . (Success) (Left Message) (Unavailable)   Care Everywhere updates requested and reviewed.  Media reports reviewed.  [facility] and [facility] reviewed.  Immunization Database ([employer]/MIXX) reviewed. Vaccinations uploaded: N/A  Next appointment please discuss DEXA Scan, mammogram, and colonoscopy. If done, where?    Health Maintenance Due   Topic Date Due    Hepatitis C Screening  Never done    COVID-19 Vaccine (1) Never done    TETANUS VACCINE  Never done    Mammogram  Never done    DEXA Scan  Never done    Colorectal Cancer Screening  Never done    Shingles Vaccine (1 of 2) Never done    Pneumococcal Vaccines (Age 65+) (3 - PPSV23 if available, else PCV20) 10/19/2018

## 2023-10-12 ENCOUNTER — OFFICE VISIT (OUTPATIENT)
Dept: FAMILY MEDICINE | Facility: CLINIC | Age: 68
End: 2023-10-12
Payer: MEDICARE

## 2023-10-12 VITALS
BODY MASS INDEX: 26.17 KG/M2 | SYSTOLIC BLOOD PRESSURE: 100 MMHG | WEIGHT: 133.31 LBS | TEMPERATURE: 98 F | HEIGHT: 60 IN | DIASTOLIC BLOOD PRESSURE: 64 MMHG | OXYGEN SATURATION: 95 % | HEART RATE: 92 BPM

## 2023-10-12 DIAGNOSIS — K21.9 GASTROESOPHAGEAL REFLUX DISEASE, UNSPECIFIED WHETHER ESOPHAGITIS PRESENT: ICD-10-CM

## 2023-10-12 DIAGNOSIS — Z13.1 SCREENING FOR DIABETES MELLITUS: ICD-10-CM

## 2023-10-12 DIAGNOSIS — Z23 NEEDS FLU SHOT: ICD-10-CM

## 2023-10-12 DIAGNOSIS — F20.9 SCHIZOPHRENIA, UNSPECIFIED TYPE: ICD-10-CM

## 2023-10-12 DIAGNOSIS — Z79.899 HIGH RISK MEDICATION USE: ICD-10-CM

## 2023-10-12 DIAGNOSIS — Z13.220 SCREENING FOR HYPERLIPIDEMIA: ICD-10-CM

## 2023-10-12 DIAGNOSIS — E03.9 HYPOTHYROIDISM, UNSPECIFIED TYPE: Primary | ICD-10-CM

## 2023-10-12 PROCEDURE — 1101F PR PT FALLS ASSESS DOC 0-1 FALLS W/OUT INJ PAST YR: ICD-10-PCS | Mod: ,,, | Performed by: NURSE PRACTITIONER

## 2023-10-12 PROCEDURE — 1159F PR MEDICATION LIST DOCUMENTED IN MEDICAL RECORD: ICD-10-PCS | Mod: ,,, | Performed by: NURSE PRACTITIONER

## 2023-10-12 PROCEDURE — 3078F DIAST BP <80 MM HG: CPT | Mod: ,,, | Performed by: NURSE PRACTITIONER

## 2023-10-12 PROCEDURE — 1160F PR REVIEW ALL MEDS BY PRESCRIBER/CLIN PHARMACIST DOCUMENTED: ICD-10-PCS | Mod: ,,, | Performed by: NURSE PRACTITIONER

## 2023-10-12 PROCEDURE — 3288F FALL RISK ASSESSMENT DOCD: CPT | Mod: ,,, | Performed by: NURSE PRACTITIONER

## 2023-10-12 PROCEDURE — 90694 FLU VACCINE - QUADRIVALENT - ADJUVANTED: ICD-10-PCS | Mod: ,,, | Performed by: NURSE PRACTITIONER

## 2023-10-12 PROCEDURE — 3008F BODY MASS INDEX DOCD: CPT | Mod: ,,, | Performed by: NURSE PRACTITIONER

## 2023-10-12 PROCEDURE — 80050 PR  GENERAL HEALTH PANEL: ICD-10-PCS | Mod: ,,, | Performed by: CLINICAL MEDICAL LABORATORY

## 2023-10-12 PROCEDURE — 1159F MED LIST DOCD IN RCRD: CPT | Mod: ,,, | Performed by: NURSE PRACTITIONER

## 2023-10-12 PROCEDURE — 80050 GENERAL HEALTH PANEL: CPT | Mod: ,,, | Performed by: CLINICAL MEDICAL LABORATORY

## 2023-10-12 PROCEDURE — 1126F PR PAIN SEVERITY QUANTIFIED, NO PAIN PRESENT: ICD-10-PCS | Mod: ,,, | Performed by: NURSE PRACTITIONER

## 2023-10-12 PROCEDURE — 3078F PR MOST RECENT DIASTOLIC BLOOD PRESSURE < 80 MM HG: ICD-10-PCS | Mod: ,,, | Performed by: NURSE PRACTITIONER

## 2023-10-12 PROCEDURE — G0008 FLU VACCINE - QUADRIVALENT - ADJUVANTED: ICD-10-PCS | Mod: ,,, | Performed by: NURSE PRACTITIONER

## 2023-10-12 PROCEDURE — 3288F PR FALLS RISK ASSESSMENT DOCUMENTED: ICD-10-PCS | Mod: ,,, | Performed by: NURSE PRACTITIONER

## 2023-10-12 PROCEDURE — G0008 ADMIN INFLUENZA VIRUS VAC: HCPCS | Mod: ,,, | Performed by: NURSE PRACTITIONER

## 2023-10-12 PROCEDURE — 3008F PR BODY MASS INDEX (BMI) DOCUMENTED: ICD-10-PCS | Mod: ,,, | Performed by: NURSE PRACTITIONER

## 2023-10-12 PROCEDURE — 80061 LIPID PANEL: CPT | Mod: ,,, | Performed by: CLINICAL MEDICAL LABORATORY

## 2023-10-12 PROCEDURE — 83036 HEMOGLOBIN A1C: ICD-10-PCS | Mod: ,,, | Performed by: CLINICAL MEDICAL LABORATORY

## 2023-10-12 PROCEDURE — 3074F PR MOST RECENT SYSTOLIC BLOOD PRESSURE < 130 MM HG: ICD-10-PCS | Mod: ,,, | Performed by: NURSE PRACTITIONER

## 2023-10-12 PROCEDURE — 1126F AMNT PAIN NOTED NONE PRSNT: CPT | Mod: ,,, | Performed by: NURSE PRACTITIONER

## 2023-10-12 PROCEDURE — 90694 VACC AIIV4 NO PRSRV 0.5ML IM: CPT | Mod: ,,, | Performed by: NURSE PRACTITIONER

## 2023-10-12 PROCEDURE — 99214 PR OFFICE/OUTPT VISIT, EST, LEVL IV, 30-39 MIN: ICD-10-PCS | Mod: ,,, | Performed by: NURSE PRACTITIONER

## 2023-10-12 PROCEDURE — 83036 HEMOGLOBIN GLYCOSYLATED A1C: CPT | Mod: ,,, | Performed by: CLINICAL MEDICAL LABORATORY

## 2023-10-12 PROCEDURE — 80061 LIPID PANEL: ICD-10-PCS | Mod: ,,, | Performed by: CLINICAL MEDICAL LABORATORY

## 2023-10-12 PROCEDURE — 1101F PT FALLS ASSESS-DOCD LE1/YR: CPT | Mod: ,,, | Performed by: NURSE PRACTITIONER

## 2023-10-12 PROCEDURE — 99214 OFFICE O/P EST MOD 30 MIN: CPT | Mod: ,,, | Performed by: NURSE PRACTITIONER

## 2023-10-12 PROCEDURE — 3074F SYST BP LT 130 MM HG: CPT | Mod: ,,, | Performed by: NURSE PRACTITIONER

## 2023-10-12 PROCEDURE — 1160F RVW MEDS BY RX/DR IN RCRD: CPT | Mod: ,,, | Performed by: NURSE PRACTITIONER

## 2023-10-12 RX ORDER — OMEPRAZOLE 40 MG/1
40 CAPSULE, DELAYED RELEASE ORAL EVERY MORNING
Qty: 90 CAPSULE | Refills: 1 | Status: SHIPPED | OUTPATIENT
Start: 2023-10-12

## 2023-10-12 RX ORDER — MULTIVIT-MIN/FA/LYCOPEN/LUTEIN .4-300-25
1 TABLET ORAL
COMMUNITY
Start: 2023-09-07

## 2023-10-12 NOTE — PROGRESS NOTES
"Clinic note     Patient name: Felicia Grijalva is a 68 y.o. female   Chief compliant   Chief Complaint   Patient presents with    Medication Refill     Requested flu vaccine.     Sore Throat     States has been bothering her for a couple of days. Dneies any other symptoms at this time. No fever reported.        Subjective     History of present illness   In clinic for routine follow up and medication refills   States she has been out of levothyroxine and Prilosec for "months"      Last office visit with me 11/8/2022 and has been lost to follow up    Past Medical History: hypothyroidism, GERD, bipolar disorder, schizophrenia    Psychiatric medications are monitored and written by [facility]   She is living with her brother in his home and states she is "doing very good" right now     Mammogram due: declines referral at this time, will consider at a later time and notify clinic when ready to proceed with referral, possible consequences of delaying screening discussed understanding verbalized   DXA due: declines referral at this time, will consider at a later time and notify clinic when ready to proceed with referral, possible consequences of delaying screening discussed understanding verbalized   Colonoscopy due: declines referral at this time, will consider at a later time and notify clinic when ready to proceed with referral, possible consequences of delaying screening discussed understanding verbalized                Social History     Tobacco Use    Smoking status: Never     Passive exposure: Past    Smokeless tobacco: Never   Substance Use Topics    Alcohol use: Never    Drug use: Never       Review of patient's allergies indicates:  No Known Allergies    Past Medical History:   Diagnosis Date    Bipolar disorder     GERD (gastroesophageal reflux disease)     Hypothyroidism     Schizophrenia, unspecified        Past Surgical History:   Procedure Laterality Date    HYSTERECTOMY      TONSILLECTOMY   "        History reviewed. No pertinent family history.      Current Outpatient Medications:     busPIRone (BUSPAR) 10 MG tablet, Take 10 mg by mouth 2 (two) times a day., Disp: , Rfl:     CERTAVITE SENIOR 0.4 mg-300 mcg- 250 mcg Tab, Take 1 tablet by mouth., Disp: , Rfl:     levothyroxine (SYNTHROID) 125 MCG tablet, Take 125 mcg by mouth every morning., Disp: , Rfl:     QUEtiapine (SEROQUEL) 200 MG Tab, Take 200 mg by mouth 2 (two) times daily., Disp: , Rfl:     risperiDONE (RISPERDAL) 2 MG tablet, Take 2 mg by mouth 2 (two) times a day., Disp: , Rfl:     traZODone (DESYREL) 100 MG tablet, Take 100 mg by mouth every evening., Disp: , Rfl:     trihexyphenidyL (ARTANE) 2 MG tablet, Take 2 mg by mouth 2 (two) times daily., Disp: , Rfl:     VISTARIL 25 mg Cap, Take 25 mg by mouth 2 (two) times daily as needed., Disp: , Rfl:     VITAMIN D3 125 mcg (5,000 unit) Tab, Take 1 tablet by mouth once daily., Disp: , Rfl:     CERTAVITE-ANTIOXIDANT Tab, Take by mouth Daily., Disp: , Rfl:     omeprazole (PRILOSEC) 40 MG capsule, Take 1 capsule (40 mg total) by mouth every morning., Disp: 90 capsule, Rfl: 1    Review of Systems   Constitutional:  Negative for appetite change, chills, fatigue, fever and unexpected weight change.   Respiratory:  Negative for cough and shortness of breath.    Cardiovascular:  Negative for chest pain, palpitations and leg swelling.   Gastrointestinal:  Negative for abdominal pain, change in bowel habit, constipation, diarrhea, nausea and vomiting.   Genitourinary:  Negative for dysuria and frequency.   Musculoskeletal:  Negative for arthralgias, gait problem and myalgias.   Neurological:  Negative for dizziness, syncope, light-headedness and headaches.   Psychiatric/Behavioral:  Negative for dysphoric mood and sleep disturbance. The patient is not nervous/anxious.        Objective     /64   Pulse 92   Temp 98.1 °F (36.7 °C)   Ht 5' (1.524 m)   Wt 60.5 kg (133 lb 4.8 oz)   SpO2 95%   BMI 26.03  kg/m²     Physical Exam   Constitutional: She is oriented to person, place, and time. She is cooperative. No distress.   HENT:   Head: Atraumatic.   Mouth/Throat: Mucous membranes are moist.   Cardiovascular: Normal rate and regular rhythm. Pulmonary:      Effort: Pulmonary effort is normal. No respiratory distress.      Breath sounds: Normal breath sounds. No wheezing, rhonchi or rales.     Abdominal: Soft. Bowel sounds are normal. She exhibits no distension. There is no abdominal tenderness.   Musculoskeletal:         General: Normal range of motion.      Cervical back: Neck supple.      Right lower leg: No edema.      Left lower leg: No edema.   Neurological: She is alert and oriented to person, place, and time. Gait normal.   Skin: Skin is warm and dry.   Psychiatric: Her behavior is normal. Mood, affect and thought content normal. Her speech is delayed. Thought content is not paranoid. She expresses no homicidal and no suicidal ideation. She expresses no suicidal plans and no homicidal plans.       Lab Results   Component Value Date    WBC 6.20 11/08/2022    HGB 13.5 11/08/2022    HCT 41.7 11/08/2022    MCV 86.5 11/08/2022     11/08/2022       CMP  Sodium   Date Value Ref Range Status   11/08/2022 138 136 - 145 mmol/L Final     Potassium   Date Value Ref Range Status   11/08/2022 3.8 3.5 - 5.1 mmol/L Final     Chloride   Date Value Ref Range Status   11/08/2022 106 98 - 107 mmol/L Final     CO2   Date Value Ref Range Status   11/08/2022 24 21 - 32 mmol/L Final     Glucose   Date Value Ref Range Status   11/08/2022 101 74 - 106 mg/dL Final     BUN   Date Value Ref Range Status   11/08/2022 6 (L) 7 - 18 mg/dL Final     Creatinine   Date Value Ref Range Status   11/08/2022 0.82 0.55 - 1.02 mg/dL Final     Calcium   Date Value Ref Range Status   11/08/2022 8.6 8.5 - 10.1 mg/dL Final     Total Protein   Date Value Ref Range Status   11/08/2022 6.5 6.4 - 8.2 g/dL Final     Albumin   Date Value Ref Range Status  "  11/08/2022 4.0 3.5 - 5.0 g/dL Final     Bilirubin, Total   Date Value Ref Range Status   11/08/2022 0.2 >0.0 - 1.2 mg/dL Final     Alk Phos   Date Value Ref Range Status   11/08/2022 82 55 - 142 U/L Final     AST   Date Value Ref Range Status   11/08/2022 29 15 - 37 U/L Final     ALT   Date Value Ref Range Status   11/08/2022 36 13 - 56 U/L Final     Anion Gap   Date Value Ref Range Status   11/08/2022 12 7 - 16 mmol/L Final     eGFR   Date Value Ref Range Status   06/02/2022 60 >=60 mL/min/1.73m² Final     Lab Results   Component Value Date    TSH 4.400 (H) 08/28/2022     Lab Results   Component Value Date    CHOL 164 08/28/2022     Lab Results   Component Value Date    HDL 48 08/28/2022     Lab Results   Component Value Date    LDLCALC 76 08/28/2022     Lab Results   Component Value Date    TRIG 198 (H) 08/28/2022     Lab Results   Component Value Date    CHOLHDL 3.4 08/28/2022     No results found for: "LABA1C", "HGBA1C"      Assessment and Plan   Hypothyroidism, unspecified type  -     TSH; Future; Expected date: 10/12/2023    Gastroesophageal reflux disease, unspecified whether esophagitis present  -     omeprazole (PRILOSEC) 40 MG capsule; Take 1 capsule (40 mg total) by mouth every morning.  Dispense: 90 capsule; Refill: 1    Needs flu shot  -     Influenza (FLUAD) - Quadrivalent (Adjuvanted) *Preferred* (65+) (PF)    Schizophrenia, unspecified type  -     Comprehensive Metabolic Panel; Future; Expected date: 10/12/2023  -     CBC Auto Differential; Future; Expected date: 10/12/2023    High risk medication use  -     Hemoglobin A1C; Future; Expected date: 10/12/2023  -     Lipid Panel; Future; Expected date: 10/12/2023  -     Comprehensive Metabolic Panel; Future; Expected date: 10/12/2023  -     CBC Auto Differential; Future; Expected date: 10/12/2023    Screening for diabetes mellitus  -     Hemoglobin A1C; Future; Expected date: 10/12/2023    Screening for hyperlipidemia  -     Lipid Panel; Future; " Expected date: 10/12/2023          Patient Instructions  Patient Instructions   Lab obtained in clinic today, we will notify you of results and any necessary changes to plan of care   Refills on Prilosec today, will review lab prior to refills being provided on levothyroxine   Follow up on  04/03/2024 and as needed; routine medication will be due 04/10/2024

## 2023-10-12 NOTE — PATIENT INSTRUCTIONS
Lab obtained in clinic today, we will notify you of results and any necessary changes to plan of care   Refills on Prilosec today, will review lab prior to refills being provided on levothyroxine   Follow up on  04/03/2024 and as needed; routine medication will be due 04/10/2024

## 2023-10-13 LAB
ALBUMIN SERPL BCP-MCNC: 3.6 G/DL (ref 3.5–5)
ALBUMIN/GLOB SERPL: 1.2 {RATIO}
ALP SERPL-CCNC: 81 U/L (ref 55–142)
ALT SERPL W P-5'-P-CCNC: 24 U/L (ref 13–56)
ANION GAP SERPL CALCULATED.3IONS-SCNC: 9 MMOL/L (ref 7–16)
AST SERPL W P-5'-P-CCNC: 18 U/L (ref 15–37)
BASOPHILS # BLD AUTO: 0.05 K/UL (ref 0–0.2)
BASOPHILS NFR BLD AUTO: 0.6 % (ref 0–1)
BILIRUB SERPL-MCNC: 0.3 MG/DL (ref ?–1.2)
BUN SERPL-MCNC: 11 MG/DL (ref 7–18)
BUN/CREAT SERPL: 12 (ref 6–20)
CALCIUM SERPL-MCNC: 8.4 MG/DL (ref 8.5–10.1)
CHLORIDE SERPL-SCNC: 106 MMOL/L (ref 98–107)
CHOLEST SERPL-MCNC: 194 MG/DL (ref 0–200)
CHOLEST/HDLC SERPL: 4.3 {RATIO}
CO2 SERPL-SCNC: 25 MMOL/L (ref 21–32)
CREAT SERPL-MCNC: 0.9 MG/DL (ref 0.55–1.02)
DIFFERENTIAL METHOD BLD: ABNORMAL
EGFR (NO RACE VARIABLE) (RUSH/TITUS): 69 ML/MIN/1.73M2
EOSINOPHIL # BLD AUTO: 0.13 K/UL (ref 0–0.5)
EOSINOPHIL NFR BLD AUTO: 1.7 % (ref 1–4)
ERYTHROCYTE [DISTWIDTH] IN BLOOD BY AUTOMATED COUNT: 14.1 % (ref 11.5–14.5)
EST. AVERAGE GLUCOSE BLD GHB EST-MCNC: 94 MG/DL
GLOBULIN SER-MCNC: 3 G/DL (ref 2–4)
GLUCOSE SERPL-MCNC: 123 MG/DL (ref 74–106)
HBA1C MFR BLD HPLC: 5.4 % (ref 4.5–6.6)
HCT VFR BLD AUTO: 39.9 % (ref 38–47)
HDLC SERPL-MCNC: 45 MG/DL (ref 40–60)
HGB BLD-MCNC: 13.1 G/DL (ref 12–16)
IMM GRANULOCYTES # BLD AUTO: 0.06 K/UL (ref 0–0.04)
IMM GRANULOCYTES NFR BLD: 0.8 % (ref 0–0.4)
LDLC SERPL CALC-MCNC: 108 MG/DL
LDLC/HDLC SERPL: 2.4 {RATIO}
LYMPHOCYTES # BLD AUTO: 1.59 K/UL (ref 1–4.8)
LYMPHOCYTES NFR BLD AUTO: 20.3 % (ref 27–41)
MCH RBC QN AUTO: 28.7 PG (ref 27–31)
MCHC RBC AUTO-ENTMCNC: 32.8 G/DL (ref 32–36)
MCV RBC AUTO: 87.3 FL (ref 80–96)
MONOCYTES # BLD AUTO: 0.52 K/UL (ref 0–0.8)
MONOCYTES NFR BLD AUTO: 6.6 % (ref 2–6)
MPC BLD CALC-MCNC: 13.7 FL (ref 9.4–12.4)
NEUTROPHILS # BLD AUTO: 5.49 K/UL (ref 1.8–7.7)
NEUTROPHILS NFR BLD AUTO: 70 % (ref 53–65)
NONHDLC SERPL-MCNC: 149 MG/DL
NRBC # BLD AUTO: 0 X10E3/UL
NRBC, AUTO (.00): 0 %
PLATELET # BLD AUTO: 181 K/UL (ref 150–400)
PLATELET MORPHOLOGY: ABNORMAL
POTASSIUM SERPL-SCNC: 3.6 MMOL/L (ref 3.5–5.1)
PROT SERPL-MCNC: 6.6 G/DL (ref 6.4–8.2)
RBC # BLD AUTO: 4.57 M/UL (ref 4.2–5.4)
RBC MORPH BLD: NORMAL
SODIUM SERPL-SCNC: 136 MMOL/L (ref 136–145)
TRIGL SERPL-MCNC: 206 MG/DL (ref 35–150)
TSH SERPL DL<=0.005 MIU/L-ACNC: 48.6 UIU/ML (ref 0.36–3.74)
VLDLC SERPL-MCNC: 41 MG/DL
WBC # BLD AUTO: 7.84 K/UL (ref 4.5–11)

## 2023-10-16 DIAGNOSIS — E03.9 HYPOTHYROIDISM, UNSPECIFIED TYPE: Primary | ICD-10-CM

## 2023-10-16 RX ORDER — LEVOTHYROXINE SODIUM 50 UG/1
50 TABLET ORAL
Qty: 90 TABLET | Refills: 0 | Status: SHIPPED | OUTPATIENT
Start: 2023-10-16 | End: 2024-03-25 | Stop reason: SDUPTHER

## 2024-03-18 DIAGNOSIS — E03.9 HYPOTHYROIDISM, UNSPECIFIED TYPE: ICD-10-CM

## 2024-03-18 NOTE — TELEPHONE ENCOUNTER
----- Message from Hyacinth Hanson sent at 3/18/2024 12:56 PM CDT -----  Refill request for thyroid meds ([facility])

## 2024-03-19 RX ORDER — LEVOTHYROXINE SODIUM 50 UG/1
50 TABLET ORAL
Qty: 90 TABLET | Refills: 0 | OUTPATIENT
Start: 2024-03-19

## 2024-03-25 ENCOUNTER — OFFICE VISIT (OUTPATIENT)
Dept: FAMILY MEDICINE | Facility: CLINIC | Age: 69
End: 2024-03-25
Payer: MEDICARE

## 2024-03-25 VITALS
SYSTOLIC BLOOD PRESSURE: 128 MMHG | RESPIRATION RATE: 14 BRPM | DIASTOLIC BLOOD PRESSURE: 72 MMHG | HEIGHT: 60 IN | OXYGEN SATURATION: 99 % | BODY MASS INDEX: 27.88 KG/M2 | WEIGHT: 142 LBS | HEART RATE: 83 BPM

## 2024-03-25 DIAGNOSIS — F20.9 SCHIZOPHRENIA, UNSPECIFIED TYPE: Chronic | ICD-10-CM

## 2024-03-25 DIAGNOSIS — E03.9 HYPOTHYROIDISM, UNSPECIFIED TYPE: Primary | Chronic | ICD-10-CM

## 2024-03-25 PROCEDURE — 1126F AMNT PAIN NOTED NONE PRSNT: CPT | Mod: ,,, | Performed by: NURSE PRACTITIONER

## 2024-03-25 PROCEDURE — 3078F DIAST BP <80 MM HG: CPT | Mod: ,,, | Performed by: NURSE PRACTITIONER

## 2024-03-25 PROCEDURE — 3074F SYST BP LT 130 MM HG: CPT | Mod: ,,, | Performed by: NURSE PRACTITIONER

## 2024-03-25 PROCEDURE — 3008F BODY MASS INDEX DOCD: CPT | Mod: ,,, | Performed by: NURSE PRACTITIONER

## 2024-03-25 PROCEDURE — 1159F MED LIST DOCD IN RCRD: CPT | Mod: ,,, | Performed by: NURSE PRACTITIONER

## 2024-03-25 PROCEDURE — 1160F RVW MEDS BY RX/DR IN RCRD: CPT | Mod: ,,, | Performed by: NURSE PRACTITIONER

## 2024-03-25 PROCEDURE — 99214 OFFICE O/P EST MOD 30 MIN: CPT | Mod: ,,, | Performed by: NURSE PRACTITIONER

## 2024-03-25 RX ORDER — LEVOTHYROXINE SODIUM 50 UG/1
50 TABLET ORAL
Qty: 90 TABLET | Refills: 0 | Status: SHIPPED | OUTPATIENT
Start: 2024-03-25

## 2024-03-25 NOTE — PATIENT INSTRUCTIONS
Refill on medication   Take levothyroxine in the morning on empty stomach, wait 30 minutes before eating or drinking after taking levothyroxine  medication  Follow up in clinic with repeat TSH  on 6/17/2024 and as needed, medication will be due again on 6/23/24

## 2024-03-25 NOTE — PROGRESS NOTES
"Clinic note     Patient name: Felicia Grijalva is a 68 y.o. female   Chief compliant   Chief Complaint   Patient presents with    Medication Refill    Thyroid Problem       Subjective     History of present illness   In clinic for routine follow up and medication refills        Past Medical History: hypothyroidism, GERD, bipolar disorder, schizophrenia     Psychiatric medications are monitored and written by [facility], she has follow up scheduled in two weeks   She continue to live with her brother in his home and states she is "doing really good with everything"  TSH obtained on 3/4/24 was 41.9; likely due to having been out of medication; last seen in clinic on 10/12/23     Mammogram due: declines referral at this time, will consider at a later time and notify clinic when ready to proceed with referral, possible consequences of delaying screening discussed understanding verbalized   DXA due: declines referral at this time, will consider at a later time and notify clinic when ready to proceed with referral, possible consequences of delaying screening discussed understanding verbalized   Colonoscopy due: declines referral at this time, will consider at a later time and notify clinic when ready to proceed with referral, possible consequences of delaying screening discussed understanding verbalized               Social History     Tobacco Use    Smoking status: Never     Passive exposure: Past    Smokeless tobacco: Never   Substance Use Topics    Alcohol use: Never    Drug use: Never       Review of patient's allergies indicates:  No Known Allergies    Past Medical History:   Diagnosis Date    Bipolar disorder     GERD (gastroesophageal reflux disease)     Hypothyroidism     Schizophrenia, unspecified        Past Surgical History:   Procedure Laterality Date    HYSTERECTOMY      TONSILLECTOMY          No family history on file.      Current Outpatient Medications:     busPIRone (BUSPAR) 10 MG tablet, " Take 10 mg by mouth 2 (two) times a day., Disp: , Rfl:     CERTAVITE SENIOR 0.4 mg-300 mcg- 250 mcg Tab, Take 1 tablet by mouth., Disp: , Rfl:     CERTAVITE-ANTIOXIDANT Tab, Take by mouth Daily., Disp: , Rfl:     levothyroxine (SYNTHROID) 50 MCG tablet, Take 1 tablet (50 mcg total) by mouth before breakfast., Disp: 90 tablet, Rfl: 0    omeprazole (PRILOSEC) 40 MG capsule, Take 1 capsule (40 mg total) by mouth every morning., Disp: 90 capsule, Rfl: 1    QUEtiapine (SEROQUEL) 200 MG Tab, Take 200 mg by mouth 2 (two) times daily., Disp: , Rfl:     risperiDONE (RISPERDAL) 2 MG tablet, Take 2 mg by mouth 2 (two) times a day., Disp: , Rfl:     traZODone (DESYREL) 100 MG tablet, Take 100 mg by mouth every evening., Disp: , Rfl:     trihexyphenidyL (ARTANE) 2 MG tablet, Take 2 mg by mouth 2 (two) times daily., Disp: , Rfl:     VISTARIL 25 mg Cap, Take 25 mg by mouth 2 (two) times daily as needed., Disp: , Rfl:     VITAMIN D3 125 mcg (5,000 unit) Tab, Take 1 tablet by mouth once daily., Disp: , Rfl:     Review of Systems   Constitutional:  Negative for appetite change, chills, fatigue, fever and unexpected weight change.   Respiratory:  Negative for cough and shortness of breath.    Cardiovascular:  Negative for chest pain, palpitations and leg swelling.   Gastrointestinal:  Negative for abdominal pain, change in bowel habit, constipation, diarrhea, nausea and vomiting.   Genitourinary:  Negative for dysuria and frequency.   Musculoskeletal:  Negative for arthralgias, gait problem and myalgias.   Neurological:  Negative for dizziness, syncope, light-headedness and headaches.   Psychiatric/Behavioral:  Positive for hallucinations (auditory hallucinations). Negative for dysphoric mood, self-injury, sleep disturbance and suicidal ideas. The patient is not nervous/anxious.        Objective     /72   Pulse 83   Resp 14   Ht 5' (1.524 m)   Wt 64.4 kg (142 lb)   SpO2 99%   BMI 27.73 kg/m²     Physical Exam    Constitutional: She is oriented to person, place, and time. No distress.   HENT:   Head: Atraumatic.   Mouth/Throat: Mucous membranes are moist.   Cardiovascular: Normal rate and regular rhythm. Pulmonary:      Effort: Pulmonary effort is normal. No respiratory distress.      Breath sounds: Normal breath sounds. No wheezing, rhonchi or rales.     Abdominal: Soft. Bowel sounds are normal. She exhibits no distension. There is no abdominal tenderness.   Musculoskeletal:         General: Normal range of motion.      Cervical back: Neck supple.      Right lower leg: No edema.      Left lower leg: No edema.   Neurological: She is alert and oriented to person, place, and time. Gait normal.   Skin: Skin is warm and dry.   Psychiatric: Her behavior is normal. Her mood appears anxious. She expresses no homicidal and no suicidal ideation. She expresses no suicidal plans and no homicidal plans.       Lab Results   Component Value Date    WBC 7.20 03/04/2024    HGB 14.2 03/04/2024    HCT 41.0 03/04/2024    MCV 84.2 03/04/2024     03/04/2024       CMP  Sodium   Date Value Ref Range Status   03/04/2024 138 136 - 145 mmol/L Final     Potassium   Date Value Ref Range Status   03/04/2024 4.0 3.5 - 5.1 mmol/L Final     Chloride   Date Value Ref Range Status   03/04/2024 107 98 - 107 mmol/L Final     CO2   Date Value Ref Range Status   03/04/2024 23 21 - 32 mmol/L Final     Glucose   Date Value Ref Range Status   03/04/2024 132 (H) 74 - 106 mg/dL Final     BUN   Date Value Ref Range Status   03/04/2024 8 7 - 18 mg/dL Final     Creatinine   Date Value Ref Range Status   03/04/2024 0.99 0.55 - 1.02 mg/dL Final     Calcium   Date Value Ref Range Status   03/04/2024 8.9 8.5 - 10.1 mg/dL Final     Total Protein   Date Value Ref Range Status   03/04/2024 6.8 6.4 - 8.2 g/dL Final     Albumin   Date Value Ref Range Status   03/04/2024 3.7 3.5 - 5.0 g/dL Final     Bilirubin, Total   Date Value Ref Range Status   03/04/2024 0.3 >0.0 -  1.2 mg/dL Final     Alk Phos   Date Value Ref Range Status   03/04/2024 89 55 - 142 U/L Final     AST   Date Value Ref Range Status   03/04/2024 19 15 - 37 U/L Final     ALT   Date Value Ref Range Status   03/04/2024 20 13 - 56 U/L Final     Anion Gap   Date Value Ref Range Status   03/04/2024 12 7 - 16 mmol/L Final     eGFR   Date Value Ref Range Status   06/02/2022 60 >=60 mL/min/1.73m² Final     Lab Results   Component Value Date    TSH 41.900 (H) 03/04/2024     Lab Results   Component Value Date    CHOL 209 (H) 03/04/2024    CHOL 194 10/12/2023    CHOL 164 08/28/2022     Lab Results   Component Value Date    HDL 45 03/04/2024    HDL 45 10/12/2023    HDL 48 08/28/2022     Lab Results   Component Value Date    LDLCALC 92 03/04/2024    LDLCALC 108 10/12/2023    LDLCALC 76 08/28/2022     Lab Results   Component Value Date    TRIG 358 (H) 03/04/2024    TRIG 206 (H) 10/12/2023    TRIG 198 (H) 08/28/2022     Lab Results   Component Value Date    CHOLHDL 4.6 03/04/2024    CHOLHDL 4.3 10/12/2023    CHOLHDL 3.4 08/28/2022     Lab Results   Component Value Date    HGBA1C 5.4 10/12/2023         Assessment and Plan   Hypothyroidism, unspecified type  -     levothyroxine (SYNTHROID) 50 MCG tablet; Take 1 tablet (50 mcg total) by mouth before breakfast.  Dispense: 90 tablet; Refill: 0  -     TSH; Future; Expected date: 06/17/2024    Schizophrenia, unspecified type          Patient Instructions  Patient Instructions   Refill on medication   Take levothyroxine in the morning on empty stomach, wait 30 minutes before eating or drinking after taking levothyroxine  medication  Follow up in clinic with repeat TSH  on 6/17/2024 and as needed, medication will be due again on 6/23/24

## 2024-06-27 ENCOUNTER — TELEPHONE (OUTPATIENT)
Dept: FAMILY MEDICINE | Facility: CLINIC | Age: 69
End: 2024-06-27
Payer: MEDICARE

## 2024-06-27 ENCOUNTER — OFFICE VISIT (OUTPATIENT)
Dept: FAMILY MEDICINE | Facility: CLINIC | Age: 69
End: 2024-06-27
Payer: MEDICARE

## 2024-06-27 VITALS
SYSTOLIC BLOOD PRESSURE: 110 MMHG | BODY MASS INDEX: 26.84 KG/M2 | OXYGEN SATURATION: 95 % | WEIGHT: 136.69 LBS | HEART RATE: 111 BPM | DIASTOLIC BLOOD PRESSURE: 74 MMHG | HEIGHT: 60 IN

## 2024-06-27 DIAGNOSIS — K21.9 GASTROESOPHAGEAL REFLUX DISEASE, UNSPECIFIED WHETHER ESOPHAGITIS PRESENT: Chronic | ICD-10-CM

## 2024-06-27 DIAGNOSIS — E03.9 HYPOTHYROIDISM, UNSPECIFIED TYPE: Primary | Chronic | ICD-10-CM

## 2024-06-27 DIAGNOSIS — F20.9 SCHIZOPHRENIA, UNSPECIFIED TYPE: ICD-10-CM

## 2024-06-27 DIAGNOSIS — Z79.899 HIGH RISK MEDICATION USE: ICD-10-CM

## 2024-06-27 LAB
BILIRUB SERPL-MCNC: NORMAL MG/DL
BLOOD URINE, POC: NORMAL
COLOR, POC UA: YELLOW
GLUCOSE UR QL STRIP: NORMAL
KETONES UR QL STRIP: NORMAL
LEUKOCYTE ESTERASE URINE, POC: NORMAL
NITRITE, POC UA: NORMAL
PH, POC UA: 6
PROTEIN, POC: NORMAL
SPECIFIC GRAVITY, POC UA: 1.02
UROBILINOGEN, POC UA: 0.2

## 2024-06-27 PROCEDURE — 1159F MED LIST DOCD IN RCRD: CPT | Mod: ,,, | Performed by: NURSE PRACTITIONER

## 2024-06-27 PROCEDURE — 3078F DIAST BP <80 MM HG: CPT | Mod: ,,, | Performed by: NURSE PRACTITIONER

## 2024-06-27 PROCEDURE — 1101F PT FALLS ASSESS-DOCD LE1/YR: CPT | Mod: ,,, | Performed by: NURSE PRACTITIONER

## 2024-06-27 PROCEDURE — 3074F SYST BP LT 130 MM HG: CPT | Mod: ,,, | Performed by: NURSE PRACTITIONER

## 2024-06-27 PROCEDURE — 81003 URINALYSIS AUTO W/O SCOPE: CPT | Mod: QW,,, | Performed by: NURSE PRACTITIONER

## 2024-06-27 PROCEDURE — 3288F FALL RISK ASSESSMENT DOCD: CPT | Mod: ,,, | Performed by: NURSE PRACTITIONER

## 2024-06-27 PROCEDURE — 1126F AMNT PAIN NOTED NONE PRSNT: CPT | Mod: ,,, | Performed by: NURSE PRACTITIONER

## 2024-06-27 PROCEDURE — 80053 COMPREHEN METABOLIC PANEL: CPT | Mod: ,,, | Performed by: CLINICAL MEDICAL LABORATORY

## 2024-06-27 PROCEDURE — 3008F BODY MASS INDEX DOCD: CPT | Mod: ,,, | Performed by: NURSE PRACTITIONER

## 2024-06-27 PROCEDURE — 85025 COMPLETE CBC W/AUTO DIFF WBC: CPT | Mod: ,,, | Performed by: CLINICAL MEDICAL LABORATORY

## 2024-06-27 PROCEDURE — 84443 ASSAY THYROID STIM HORMONE: CPT | Mod: ,,, | Performed by: CLINICAL MEDICAL LABORATORY

## 2024-06-27 PROCEDURE — 1160F RVW MEDS BY RX/DR IN RCRD: CPT | Mod: ,,, | Performed by: NURSE PRACTITIONER

## 2024-06-27 PROCEDURE — 99214 OFFICE O/P EST MOD 30 MIN: CPT | Mod: ,,, | Performed by: NURSE PRACTITIONER

## 2024-06-27 RX ORDER — PANTOPRAZOLE SODIUM 40 MG/1
40 TABLET, DELAYED RELEASE ORAL DAILY
COMMUNITY
Start: 2024-06-11

## 2024-06-27 RX ORDER — RISPERIDONE 4 MG/1
4 TABLET ORAL 2 TIMES DAILY
COMMUNITY
Start: 2024-06-26

## 2024-06-27 RX ORDER — OMEPRAZOLE 40 MG/1
40 CAPSULE, DELAYED RELEASE ORAL EVERY MORNING
Qty: 30 CAPSULE | Refills: 0 | Status: SHIPPED | OUTPATIENT
Start: 2024-06-27

## 2024-06-27 RX ORDER — QUETIAPINE FUMARATE 400 MG/1
800 TABLET, FILM COATED ORAL NIGHTLY
COMMUNITY
Start: 2024-06-26

## 2024-06-27 RX ORDER — OMEPRAZOLE 40 MG/1
40 CAPSULE, DELAYED RELEASE ORAL EVERY MORNING
Qty: 90 CAPSULE | Refills: 1 | Status: CANCELLED | OUTPATIENT
Start: 2024-06-27

## 2024-06-27 RX ORDER — LEVOTHYROXINE SODIUM 50 UG/1
50 TABLET ORAL
Qty: 90 TABLET | Refills: 0 | Status: SHIPPED | OUTPATIENT
Start: 2024-06-27

## 2024-06-27 RX ORDER — TRAZODONE HYDROCHLORIDE 150 MG/1
150 TABLET ORAL NIGHTLY
COMMUNITY
Start: 2024-06-26

## 2024-06-27 NOTE — TELEPHONE ENCOUNTER
Called and spoke with pt, pt requesting medication refills. Pt instructed that she needs an office visit and labs done before she can get meds refilled. Pt scheduled for an OV today at 1:00 pm. Pt voices understanding.

## 2024-06-27 NOTE — TELEPHONE ENCOUNTER
----- Message from Jada Quiles RT sent at 6/27/2024 10:43 AM CDT -----  Pt needs refill for synthroid. [phone]

## 2024-06-27 NOTE — PROGRESS NOTES
Clinic note     Patient name: Felicia Grijalva is a 69 y.o. female   Chief compliant   Chief Complaint   Patient presents with    Medication Refill     C/o of acid reflux with some vomiting, has ran out of Protonix.        Subjective     History of present illness   In clinic for routine follow up and medication refills   She was written Protonix by Dr Mcbride but has ran out and cannot till until July 11, will write Prilosec daily for # 30 so she will have this until she can refill Protonix; dosing discussed understanding verbalized     Past Medical History: hypothyroidism, GERD, bipolar disorder, schizophrenia     Psychiatric medications are monitored and written by [facility], there has been several dose adjustments of psychiatric medications noted   TSH obtained on 3/4/24 was 41.9; likely due to having been out of medication, will repeat lab today     There are some lab she needs obtained for [facility], forgot to bring paper order in today, will order lab and forward results to [place] per her request     Family present during office visit today              Social History     Tobacco Use    Smoking status: Never     Passive exposure: Past    Smokeless tobacco: Never   Substance Use Topics    Alcohol use: Never    Drug use: Never       Review of patient's allergies indicates:  No Known Allergies    Past Medical History:   Diagnosis Date    Bipolar disorder     GERD (gastroesophageal reflux disease)     Hypothyroidism     Schizophrenia, unspecified        Past Surgical History:   Procedure Laterality Date    HYSTERECTOMY      TONSILLECTOMY          No family history on file.      Current Outpatient Medications:     busPIRone (BUSPAR) 10 MG tablet, Take 10 mg by mouth 2 (two) times a day., Disp: , Rfl:     pantoprazole (PROTONIX) 40 MG tablet, Take 40 mg by mouth once daily., Disp: , Rfl:     QUEtiapine (SEROQUEL) 400 MG tablet, Take 800 mg by mouth every evening., Disp: , Rfl:      RISPERDAL 4 mg tablet, Take 4 mg by mouth 2 (two) times daily., Disp: , Rfl:     traZODone (DESYREL) 150 MG tablet, Take 150 mg by mouth every evening., Disp: , Rfl:     VISTARIL 25 mg Cap, Take 25 mg by mouth 2 (two) times daily as needed., Disp: , Rfl:     CERTAVITE SENIOR 0.4 mg-300 mcg- 250 mcg Tab, Take 1 tablet by mouth., Disp: , Rfl:     CERTAVITE-ANTIOXIDANT Tab, Take by mouth Daily., Disp: , Rfl:     levothyroxine (SYNTHROID) 50 MCG tablet, Take 1 tablet (50 mcg total) by mouth before breakfast., Disp: 90 tablet, Rfl: 0    omeprazole (PRILOSEC) 40 MG capsule, Take 1 capsule (40 mg total) by mouth every morning., Disp: 30 capsule, Rfl: 0    trihexyphenidyL (ARTANE) 2 MG tablet, Take 2 mg by mouth 2 (two) times daily., Disp: , Rfl:     VITAMIN D3 125 mcg (5,000 unit) Tab, Take 1 tablet by mouth once daily., Disp: , Rfl:     Review of Systems   Constitutional:  Negative for appetite change, chills, fatigue, fever and unexpected weight change.   Respiratory:  Negative for cough and shortness of breath.    Cardiovascular:  Negative for chest pain, palpitations and leg swelling.   Gastrointestinal:  Positive for reflux. Negative for abdominal pain, change in bowel habit, constipation, diarrhea, nausea and vomiting.   Genitourinary:  Negative for dysuria and frequency.   Musculoskeletal:  Positive for back pain. Negative for arthralgias, gait problem and myalgias.   Neurological:  Negative for dizziness, syncope, light-headedness and headaches.   Psychiatric/Behavioral:  Negative for dysphoric mood and sleep disturbance. The patient is nervous/anxious.        Objective     /74   Pulse (!) 111   Ht 5' (1.524 m)   Wt 62 kg (136 lb 11.2 oz)   SpO2 95%   BMI 26.70 kg/m²     Physical Exam   Constitutional: She is oriented to person, place, and time. She is cooperative. No distress.   HENT:   Head: Atraumatic.   Mouth/Throat: Mucous membranes are moist.   Cardiovascular: Normal rate and regular rhythm.  Pulmonary:      Effort: Pulmonary effort is normal. No respiratory distress.      Breath sounds: Normal breath sounds. No wheezing, rhonchi or rales.     Abdominal: Soft. Bowel sounds are normal. She exhibits no distension. There is no abdominal tenderness.   Musculoskeletal:         General: Normal range of motion.      Cervical back: Neck supple.      Right lower leg: No edema.      Left lower leg: No edema.   Neurological: She is alert and oriented to person, place, and time. Gait normal.   Skin: Skin is warm and dry.   Psychiatric: Her speech is normal and behavior is normal. Affect normal. Her mood appears anxious.       Lab Results   Component Value Date    WBC 7.20 03/04/2024    HGB 14.2 03/04/2024    HCT 41.0 03/04/2024    MCV 84.2 03/04/2024     03/04/2024       CMP  Sodium   Date Value Ref Range Status   03/04/2024 138 136 - 145 mmol/L Final     Potassium   Date Value Ref Range Status   03/04/2024 4.0 3.5 - 5.1 mmol/L Final     Chloride   Date Value Ref Range Status   03/04/2024 107 98 - 107 mmol/L Final     CO2   Date Value Ref Range Status   03/04/2024 23 21 - 32 mmol/L Final     Glucose   Date Value Ref Range Status   03/04/2024 132 (H) 74 - 106 mg/dL Final     BUN   Date Value Ref Range Status   03/04/2024 8 7 - 18 mg/dL Final     Creatinine   Date Value Ref Range Status   03/04/2024 0.99 0.55 - 1.02 mg/dL Final     Calcium   Date Value Ref Range Status   03/04/2024 8.9 8.5 - 10.1 mg/dL Final     Total Protein   Date Value Ref Range Status   03/04/2024 6.8 6.4 - 8.2 g/dL Final     Albumin   Date Value Ref Range Status   03/04/2024 3.7 3.5 - 5.0 g/dL Final     Bilirubin, Total   Date Value Ref Range Status   03/04/2024 0.3 >0.0 - 1.2 mg/dL Final     Alk Phos   Date Value Ref Range Status   03/04/2024 89 55 - 142 U/L Final     AST   Date Value Ref Range Status   03/04/2024 19 15 - 37 U/L Final     ALT   Date Value Ref Range Status   03/04/2024 20 13 - 56 U/L Final     Anion Gap   Date Value Ref  Range Status   03/04/2024 12 7 - 16 mmol/L Final     eGFR   Date Value Ref Range Status   06/02/2022 60 >=60 mL/min/1.73m² Final     Lab Results   Component Value Date    TSH 41.900 (H) 03/04/2024     Lab Results   Component Value Date    CHOL 209 (H) 03/04/2024    CHOL 194 10/12/2023    CHOL 164 08/28/2022     Lab Results   Component Value Date    HDL 45 03/04/2024    HDL 45 10/12/2023    HDL 48 08/28/2022     Lab Results   Component Value Date    LDLCALC 92 03/04/2024    LDLCALC 108 10/12/2023    LDLCALC 76 08/28/2022     Lab Results   Component Value Date    TRIG 358 (H) 03/04/2024    TRIG 206 (H) 10/12/2023    TRIG 198 (H) 08/28/2022     Lab Results   Component Value Date    CHOLHDL 4.6 03/04/2024    CHOLHDL 4.3 10/12/2023    CHOLHDL 3.4 08/28/2022     Lab Results   Component Value Date    HGBA1C 5.4 10/12/2023         Assessment and Plan   Hypothyroidism, unspecified type  -     TSH; Future; Expected date: 06/27/2024  -     levothyroxine (SYNTHROID) 50 MCG tablet; Take 1 tablet (50 mcg total) by mouth before breakfast.  Dispense: 90 tablet; Refill: 0    Gastroesophageal reflux disease, unspecified whether esophagitis present  -     omeprazole (PRILOSEC) 40 MG capsule; Take 1 capsule (40 mg total) by mouth every morning.  Dispense: 30 capsule; Refill: 0    Schizophrenia, unspecified type  Comments:  Followed by [facility]  Orders:  -     CBC Auto Differential; Future; Expected date: 06/27/2024  -     Comprehensive Metabolic Panel; Future; Expected date: 06/27/2024  -     Hemoglobin A1C; Future; Expected date: 06/27/2024  -     POCT URINALYSIS W/O SCOPE    High risk medication use  -     CBC Auto Differential; Future; Expected date: 06/27/2024  -     Comprehensive Metabolic Panel; Future; Expected date: 06/27/2024  -     Hemoglobin A1C; Future; Expected date: 06/27/2024  -     POCT URINALYSIS W/O SCOPE          Patient Instructions  Patient Instructions   Prilosec written for 30 day supply, take  one capsule daily until you refill and start taking Protonix  stop Prilosec when you restart Protonix    Lab obtained in clinic today, we will notify you of results and any necessary changes to plan of care   Refills on routine medications   Follow up on 9/18/2024 and as needed; routine medication will be due 9/24/2024

## 2024-06-27 NOTE — PATIENT INSTRUCTIONS
Prilosec written for 30 day supply, take one capsule daily until you refill and start taking Protonix  stop Prilosec when you restart Protonix    Lab obtained in clinic today, we will notify you of results and any necessary changes to plan of care   Refills on routine medications   Follow up on 9/18/2024 and as needed; routine medication will be due 9/24/2024

## 2024-06-28 LAB
ALBUMIN SERPL BCP-MCNC: 3.8 G/DL (ref 3.5–5)
ALBUMIN/GLOB SERPL: 1.3 {RATIO}
ALP SERPL-CCNC: 79 U/L (ref 55–142)
ALT SERPL W P-5'-P-CCNC: 20 U/L (ref 13–56)
ANION GAP SERPL CALCULATED.3IONS-SCNC: 16 MMOL/L (ref 7–16)
AST SERPL W P-5'-P-CCNC: 19 U/L (ref 15–37)
BASOPHILS # BLD AUTO: 0.04 K/UL (ref 0–0.2)
BASOPHILS NFR BLD AUTO: 0.6 % (ref 0–1)
BILIRUB SERPL-MCNC: 0.5 MG/DL (ref ?–1.2)
BUN SERPL-MCNC: 7 MG/DL (ref 7–18)
BUN/CREAT SERPL: 6 (ref 6–20)
CALCIUM SERPL-MCNC: 8.9 MG/DL (ref 8.5–10.1)
CHLORIDE SERPL-SCNC: 105 MMOL/L (ref 98–107)
CO2 SERPL-SCNC: 18 MMOL/L (ref 21–32)
CREAT SERPL-MCNC: 1.15 MG/DL (ref 0.55–1.02)
DIFFERENTIAL METHOD BLD: ABNORMAL
EGFR (NO RACE VARIABLE) (RUSH/TITUS): 51 ML/MIN/1.73M2
EOSINOPHIL # BLD AUTO: 0.03 K/UL (ref 0–0.5)
EOSINOPHIL NFR BLD AUTO: 0.4 % (ref 1–4)
ERYTHROCYTE [DISTWIDTH] IN BLOOD BY AUTOMATED COUNT: 12.8 % (ref 11.5–14.5)
EST. AVERAGE GLUCOSE BLD GHB EST-MCNC: 114 MG/DL
GLOBULIN SER-MCNC: 2.9 G/DL (ref 2–4)
GLUCOSE SERPL-MCNC: 127 MG/DL (ref 74–106)
HBA1C MFR BLD HPLC: 5.6 % (ref 4.5–6.6)
HCT VFR BLD AUTO: 45.4 % (ref 38–47)
HGB BLD-MCNC: 14.8 G/DL (ref 12–16)
IMM GRANULOCYTES # BLD AUTO: 0.03 K/UL (ref 0–0.04)
IMM GRANULOCYTES NFR BLD: 0.4 % (ref 0–0.4)
LYMPHOCYTES # BLD AUTO: 1.68 K/UL (ref 1–4.8)
LYMPHOCYTES NFR BLD AUTO: 24.6 % (ref 27–41)
MCH RBC QN AUTO: 28.3 PG (ref 27–31)
MCHC RBC AUTO-ENTMCNC: 32.6 G/DL (ref 32–36)
MCV RBC AUTO: 86.8 FL (ref 80–96)
MONOCYTES # BLD AUTO: 0.43 K/UL (ref 0–0.8)
MONOCYTES NFR BLD AUTO: 6.3 % (ref 2–6)
MPC BLD CALC-MCNC: 13.5 FL (ref 9.4–12.4)
NEUTROPHILS # BLD AUTO: 4.63 K/UL (ref 1.8–7.7)
NEUTROPHILS NFR BLD AUTO: 67.7 % (ref 53–65)
NRBC # BLD AUTO: 0 X10E3/UL
NRBC, AUTO (.00): 0 %
PLATELET # BLD AUTO: 192 K/UL (ref 150–400)
PLATELET MORPHOLOGY: NORMAL
POTASSIUM SERPL-SCNC: 3.8 MMOL/L (ref 3.5–5.1)
PROT SERPL-MCNC: 6.7 G/DL (ref 6.4–8.2)
RBC # BLD AUTO: 5.23 M/UL (ref 4.2–5.4)
RBC MORPH BLD: NORMAL
SODIUM SERPL-SCNC: 135 MMOL/L (ref 136–145)
TSH SERPL DL<=0.005 MIU/L-ACNC: 6.75 UIU/ML (ref 0.36–3.74)
WBC # BLD AUTO: 6.84 K/UL (ref 4.5–11)

## 2024-09-17 PROBLEM — R79.89 ELEVATED SERUM CREATININE: Status: ACTIVE | Noted: 2024-09-17

## 2024-10-07 ENCOUNTER — TELEPHONE (OUTPATIENT)
Dept: FAMILY MEDICINE | Facility: CLINIC | Age: 69
End: 2024-10-07
Payer: MEDICARE

## 2024-10-07 NOTE — TELEPHONE ENCOUNTER
----- Message from Stacie sent at 10/7/2024  3:48 PM CDT -----  Return call to Ev[facility] Benite[facility] [phone] concerning med refill called into [facility] for Felicia

## 2024-10-31 ENCOUNTER — PATIENT OUTREACH (OUTPATIENT)
Dept: ADMINISTRATIVE | Facility: CLINIC | Age: 69
End: 2024-10-31

## 2024-10-31 ENCOUNTER — EXTERNAL HOSPITAL ADMISSION (OUTPATIENT)
Dept: ADMINISTRATIVE | Facility: CLINIC | Age: 69
End: 2024-10-31

## 2024-11-25 ENCOUNTER — EXTERNAL HOME HEALTH (OUTPATIENT)
Dept: HOME HEALTH SERVICES | Facility: HOSPITAL | Age: 69
End: 2024-11-25
Payer: MEDICARE

## 2024-11-26 ENCOUNTER — OFFICE VISIT (OUTPATIENT)
Dept: FAMILY MEDICINE | Facility: CLINIC | Age: 69
End: 2024-11-26
Payer: MEDICARE

## 2024-11-26 VITALS
HEIGHT: 60 IN | WEIGHT: 131.13 LBS | TEMPERATURE: 98 F | SYSTOLIC BLOOD PRESSURE: 109 MMHG | BODY MASS INDEX: 25.74 KG/M2 | HEART RATE: 68 BPM | DIASTOLIC BLOOD PRESSURE: 74 MMHG | OXYGEN SATURATION: 97 %

## 2024-11-26 DIAGNOSIS — Z79.899 HIGH RISK MEDICATION USE: Chronic | ICD-10-CM

## 2024-11-26 DIAGNOSIS — F20.9 SCHIZOPHRENIA, UNSPECIFIED TYPE: Primary | Chronic | ICD-10-CM

## 2024-11-26 DIAGNOSIS — Z23 FLU VACCINE NEED: ICD-10-CM

## 2024-11-26 DIAGNOSIS — E03.9 HYPOTHYROIDISM, UNSPECIFIED TYPE: Chronic | ICD-10-CM

## 2024-11-26 RX ORDER — HYDROXYZINE PAMOATE 25 MG/1
25 CAPSULE ORAL 3 TIMES DAILY
COMMUNITY
Start: 2024-11-19 | End: 2024-12-19

## 2024-11-26 RX ORDER — QUETIAPINE FUMARATE 200 MG/1
200 TABLET, FILM COATED ORAL NIGHTLY
COMMUNITY
Start: 2024-11-19 | End: 2024-12-19

## 2024-11-26 RX ORDER — LEVOTHYROXINE SODIUM 75 UG/1
75 TABLET ORAL EVERY MORNING
COMMUNITY
End: 2024-11-26 | Stop reason: SDUPTHER

## 2024-11-26 RX ORDER — TRIHEXYPHENIDYL HYDROCHLORIDE 2 MG/1
2 TABLET ORAL
COMMUNITY
Start: 2024-11-19 | End: 2024-12-19

## 2024-11-26 RX ORDER — LEVOTHYROXINE SODIUM 75 UG/1
75 TABLET ORAL EVERY MORNING
Qty: 90 TABLET | Refills: 0 | Status: SHIPPED | OUTPATIENT
Start: 2024-11-26

## 2024-11-26 RX ORDER — QUETIAPINE FUMARATE 50 MG/1
50 TABLET, FILM COATED ORAL DAILY
COMMUNITY
Start: 2024-11-20 | End: 2024-12-20

## 2024-11-26 NOTE — PROGRESS NOTES
Clinic note     Patient name: Felicia Grijalva is a 69 y.o. female   Chief compliant   Chief Complaint   Patient presents with    Hospital Follow Up     Here for a hospital follow up. States she has been doing good since she was discharged.  Needs refills on thyroid medications.  Pt requesting flu vaccine, mixx reviewed at this time.        Subjective     History of present illness   In clinic for hospital follow up r/t stay at [facility] 11/6/24   Records reviewed, medications reconciled per hospital discharge summary     Family and/or Caretaker present at visit? Sister present   Diagnostic tests reviewed/disposition: no review of diagnostic tests  Disease/illness education: education provided at hospital discharge, reviewed taking levothyroxine on empty stomach and waiting 30 minutes before eating   Home health/community services discussion/referrals: [facility]/t referral at hospital discharge   Establishment or re-establishment of referral orders for community resources: No other necessary community resources.   Discussion with other health care providers: No discussion with other health care providers necessary.     She had follow up scheduled with Carmen Madrigla 11/21/2024 at [facility] prior to appointment being scheduled with Millie COLES           Social History     Tobacco Use    Smoking status: Never     Passive exposure: Past    Smokeless tobacco: Never   Substance Use Topics    Alcohol use: Never    Drug use: Never       Review of patient's allergies indicates:  No Known Allergies    Past Medical History:   Diagnosis Date    Bipolar disorder     GERD (gastroesophageal reflux disease)     Hypothyroidism     Schizophrenia, unspecified        Past Surgical History:   Procedure Laterality Date    HYSTERECTOMY      TONSILLECTOMY          No family history on file.      Current Outpatient Medications:     hydrOXYzine pamoate (VISTARIL) 25 MG Cap, Take 25 mg by  mouth 3 (three) times daily. Take one in the AM, one at Noon, one a bedtime., Disp: , Rfl:     QUEtiapine (SEROQUEL) 200 MG Tab, Take 200 mg by mouth nightly., Disp: , Rfl:     QUEtiapine (SEROQUEL) 50 MG tablet, Take 50 mg by mouth once daily., Disp: , Rfl:     trihexyphenidyL (ARTANE) 2 MG tablet, Take 2 mg by mouth 3 (three) times daily with meals., Disp: , Rfl:     levothyroxine (SYNTHROID) 75 MCG tablet, Take 1 tablet (75 mcg total) by mouth every morning., Disp: 90 tablet, Rfl: 0  No current facility-administered medications for this visit.    Review of Systems   Constitutional:  Negative for appetite change, chills, fatigue, fever and unexpected weight change.   Respiratory:  Negative for cough and shortness of breath.    Cardiovascular:  Negative for chest pain, palpitations and leg swelling.   Gastrointestinal:  Negative for abdominal pain, change in bowel habit, constipation, diarrhea, nausea and vomiting.   Genitourinary:  Negative for dysuria and frequency.   Musculoskeletal:  Negative for arthralgias, gait problem and myalgias.   Neurological:  Negative for dizziness, syncope, light-headedness and headaches.        Tardive dyskinesia      Psychiatric/Behavioral:  Positive for hallucinations. Negative for dysphoric mood, sleep disturbance and suicidal ideas. The patient is nervous/anxious.        Objective     /74 (BP Location: Left arm, Patient Position: Sitting)   Pulse 68   Temp 97.7 °F (36.5 °C)   Ht 5' (1.524 m)   Wt 59.5 kg (131 lb 1.6 oz)   SpO2 97%   BMI 25.60 kg/m²     Physical Exam   Constitutional: She is oriented to person, place, and time. She is cooperative. No distress.   HENT:   Head: Atraumatic.   Mouth/Throat: Mucous membranes are moist.   Cardiovascular: Normal rate and regular rhythm. Pulmonary:      Effort: Pulmonary effort is normal. No respiratory distress.      Breath sounds: Normal breath sounds. No wheezing, rhonchi or rales.     Abdominal: Soft. Bowel sounds are  normal. She exhibits no distension. There is no abdominal tenderness.   Musculoskeletal:         General: Normal range of motion.      Cervical back: Neck supple.      Right lower leg: No edema.      Left lower leg: No edema.   Neurological: She is alert and oriented to person, place, and time. Gait normal.   TD movements    Skin: Skin is warm and dry.   Psychiatric: Her behavior is normal. She expresses no homicidal and no suicidal ideation. She expresses no suicidal plans and no homicidal plans.       Lab Results   Component Value Date    WBC 6.84 06/27/2024    HGB 14.8 06/27/2024    HCT 45.4 06/27/2024    MCV 86.8 06/27/2024     06/27/2024       CMP  Sodium   Date Value Ref Range Status   11/05/2024 130 (L) 135 - 145 mmol/L Final     Potassium   Date Value Ref Range Status   06/27/2024 3.8 3.5 - 5.1 mmol/L Final     Chloride   Date Value Ref Range Status   06/27/2024 105 98 - 107 mmol/L Final     CO2   Date Value Ref Range Status   06/27/2024 18 (L) 21 - 32 mmol/L Final     Glucose   Date Value Ref Range Status   06/27/2024 127 (H) 74 - 106 mg/dL Final     BUN   Date Value Ref Range Status   06/27/2024 7 7 - 18 mg/dL Final     Creatinine   Date Value Ref Range Status   06/27/2024 1.15 (H) 0.55 - 1.02 mg/dL Final     Calcium   Date Value Ref Range Status   06/27/2024 8.9 8.5 - 10.1 mg/dL Final     Total Protein   Date Value Ref Range Status   06/27/2024 6.7 6.4 - 8.2 g/dL Final     Albumin   Date Value Ref Range Status   06/27/2024 3.8 3.5 - 5.0 g/dL Final     Bilirubin, Total   Date Value Ref Range Status   06/27/2024 0.5 >0.0 - 1.2 mg/dL Final     Alk Phos   Date Value Ref Range Status   06/27/2024 79 55 - 142 U/L Final     AST   Date Value Ref Range Status   06/27/2024 19 15 - 37 U/L Final     ALT   Date Value Ref Range Status   06/27/2024 20 13 - 56 U/L Final     Anion Gap   Date Value Ref Range Status   06/27/2024 16 7 - 16 mmol/L Final     eGFR   Date Value Ref Range Status   06/02/2022 60 >=60  mL/min/1.73m² Final     Lab Results   Component Value Date    TSH 6.750 (H) 06/27/2024     Lab Results   Component Value Date    CHOL 209 (H) 03/04/2024    CHOL 194 10/12/2023    CHOL 164 08/28/2022     Lab Results   Component Value Date    HDL 45 03/04/2024    HDL 45 10/12/2023    HDL 48 08/28/2022     Lab Results   Component Value Date    LDLCALC 92 03/04/2024    LDLCALC 108 10/12/2023    LDLCALC 76 08/28/2022     Lab Results   Component Value Date    TRIG 358 (H) 03/04/2024    TRIG 206 (H) 10/12/2023    TRIG 198 (H) 08/28/2022     Lab Results   Component Value Date    CHOLHDL 4.6 03/04/2024    CHOLHDL 4.3 10/12/2023    CHOLHDL 3.4 08/28/2022     Lab Results   Component Value Date    HGBA1C 5.6 06/27/2024         Assessment and Plan   Schizophrenia, unspecified type    Hypothyroidism, unspecified type  -     levothyroxine (SYNTHROID) 75 MCG tablet; Take 1 tablet (75 mcg total) by mouth every morning.  Dispense: 90 tablet; Refill: 0    High risk medication use    Flu vaccine need  -     influenza (adjuvanted) (Fluad) 45 mcg/0.5 mL IM vaccine (> or = 64 yo) 0.5 mL          Patient Instructions  Patient Instructions   Refills provided on levothyroxine   Follow up on 2/17/2025 and as needed, refills on levothyroxine will be due 2/24/2025  TSH lab will be due at that time

## 2024-11-26 NOTE — PATIENT INSTRUCTIONS
Refills provided on levothyroxine   Follow up on 2/17/2025 and as needed, refills on levothyroxine will be due 2/24/2025  TSH lab will be due at that time

## 2024-11-27 ENCOUNTER — EXTERNAL HOME HEALTH (OUTPATIENT)
Dept: HOME HEALTH SERVICES | Facility: HOSPITAL | Age: 69
End: 2024-11-27
Payer: MEDICARE

## 2024-12-09 ENCOUNTER — EXTERNAL HOME HEALTH (OUTPATIENT)
Dept: HOME HEALTH SERVICES | Facility: HOSPITAL | Age: 69
End: 2024-12-09
Payer: MEDICARE

## 2024-12-11 ENCOUNTER — DOCUMENT SCAN (OUTPATIENT)
Dept: HOME HEALTH SERVICES | Facility: HOSPITAL | Age: 69
End: 2024-12-11
Payer: MEDICARE

## 2024-12-17 ENCOUNTER — DOCUMENT SCAN (OUTPATIENT)
Dept: HOME HEALTH SERVICES | Facility: HOSPITAL | Age: 69
End: 2024-12-17
Payer: MEDICARE

## 2024-12-23 ENCOUNTER — DOCUMENT SCAN (OUTPATIENT)
Dept: HOME HEALTH SERVICES | Facility: HOSPITAL | Age: 69
End: 2024-12-23
Payer: MEDICARE

## 2025-01-08 ENCOUNTER — OFFICE VISIT (OUTPATIENT)
Dept: FAMILY MEDICINE | Facility: CLINIC | Age: 70
End: 2025-01-08
Payer: MEDICARE

## 2025-01-08 VITALS
WEIGHT: 127 LBS | OXYGEN SATURATION: 97 % | DIASTOLIC BLOOD PRESSURE: 70 MMHG | BODY MASS INDEX: 24.94 KG/M2 | HEIGHT: 60 IN | TEMPERATURE: 98 F | HEART RATE: 103 BPM | SYSTOLIC BLOOD PRESSURE: 102 MMHG

## 2025-01-08 DIAGNOSIS — R05.8 COUGH WITH EXPOSURE TO SEVERE ACUTE RESPIRATORY SYNDROME CORONAVIRUS 2 (SARS-COV-2): ICD-10-CM

## 2025-01-08 DIAGNOSIS — R09.81 SINUS CONGESTION: Primary | ICD-10-CM

## 2025-01-08 DIAGNOSIS — Z20.822 COUGH WITH EXPOSURE TO SEVERE ACUTE RESPIRATORY SYNDROME CORONAVIRUS 2 (SARS-COV-2): ICD-10-CM

## 2025-01-08 DIAGNOSIS — K21.9 GASTROESOPHAGEAL REFLUX DISEASE, UNSPECIFIED WHETHER ESOPHAGITIS PRESENT: ICD-10-CM

## 2025-01-08 DIAGNOSIS — J02.9 PHARYNGITIS, UNSPECIFIED ETIOLOGY: ICD-10-CM

## 2025-01-08 LAB
CTP QC/QA: YES
CTP QC/QA: YES
POC MOLECULAR INFLUENZA A AGN: NEGATIVE
POC MOLECULAR INFLUENZA B AGN: NEGATIVE
SARS-COV-2 RDRP RESP QL NAA+PROBE: NEGATIVE

## 2025-01-08 PROCEDURE — 87635 SARS-COV-2 COVID-19 AMP PRB: CPT | Mod: RHCUB | Performed by: NURSE PRACTITIONER

## 2025-01-08 PROCEDURE — 99213 OFFICE O/P EST LOW 20 MIN: CPT | Mod: ,,, | Performed by: NURSE PRACTITIONER

## 2025-01-08 PROCEDURE — 87502 INFLUENZA DNA AMP PROBE: CPT | Mod: RHCUB | Performed by: NURSE PRACTITIONER

## 2025-01-08 RX ORDER — QUETIAPINE FUMARATE 100 MG/1
100 TABLET, FILM COATED ORAL EVERY MORNING
COMMUNITY
Start: 2025-01-02

## 2025-01-08 RX ORDER — PANTOPRAZOLE SODIUM 20 MG/1
20 TABLET, DELAYED RELEASE ORAL DAILY
Qty: 90 TABLET | Refills: 0 | Status: SHIPPED | OUTPATIENT
Start: 2025-01-08

## 2025-01-08 RX ORDER — QUETIAPINE FUMARATE 200 MG/1
200 TABLET, FILM COATED ORAL NIGHTLY
COMMUNITY
Start: 2025-01-02

## 2025-01-08 NOTE — PROGRESS NOTES
Clinic note     Patient name: Felicia Grijalva is a 69 y.o. female   Chief compliant   Chief Complaint   Patient presents with    Cough     Cough, sinus congestion, sore throat x4 days. Sister would like her tested for coivd.        Subjective     History of present illness   In clinic for evaluation of cough, sinus congestion and sore throat x 4 days, denies any fever   Sister is also sick with similar symptoms   Her sister is present and states Ms Grijalva finished a z-pack yesterday that was written by [facility] clinic for pharyngitis   Request refill on Protonix which was stopped during last hospital stay, written in the past by Dr Mcbride      Past Medical History: hypothyroidism, GERD, schizophrenia, bipolar disorder             Social History     Tobacco Use    Smoking status: Never     Passive exposure: Past    Smokeless tobacco: Never   Substance Use Topics    Alcohol use: Never    Drug use: Never       Review of patient's allergies indicates:  No Known Allergies    Past Medical History:   Diagnosis Date    Bipolar disorder     GERD (gastroesophageal reflux disease)     Hypothyroidism     Schizophrenia, unspecified        Past Surgical History:   Procedure Laterality Date    HYSTERECTOMY      TONSILLECTOMY          No family history on file.      Current Outpatient Medications:     levothyroxine (SYNTHROID) 75 MCG tablet, Take 1 tablet (75 mcg total) by mouth every morning., Disp: 90 tablet, Rfl: 0    QUEtiapine (SEROQUEL) 100 MG Tab, Take 100 mg by mouth every morning., Disp: , Rfl:     QUEtiapine (SEROQUEL) 200 MG Tab, Take 200 mg by mouth every evening., Disp: , Rfl:     pantoprazole (PROTONIX) 20 MG tablet, Take 1 tablet (20 mg total) by mouth once daily., Disp: 90 tablet, Rfl: 0    trihexyphenidyL (ARTANE) 2 MG tablet, Take 2 mg by mouth 3 (three) times daily with meals., Disp: , Rfl:     Review of Systems   Constitutional:  Negative for chills and fever.   HENT:  Positive for nasal congestion  and sore throat.    Respiratory:  Positive for cough. Negative for shortness of breath.    Cardiovascular:  Negative for chest pain.   Gastrointestinal:  Positive for reflux. Negative for diarrhea, nausea and vomiting.   Musculoskeletal:  Negative for myalgias.   Neurological:  Negative for headaches.       Objective     /70   Pulse 103   Temp 97.8 °F (36.6 °C)   Ht 5' (1.524 m)   Wt 57.6 kg (127 lb)   SpO2 97%   BMI 24.80 kg/m²     Physical Exam   Constitutional: She is oriented to person, place, and time. No distress.   HENT:   Head: Atraumatic.   Right Ear: Tympanic membrane and ear canal normal.   Left Ear: Tympanic membrane and ear canal normal.   Nose: Nose normal.   Mouth/Throat: Oropharynx is clear and moist. Mucous membranes are moist.   Eyes: Conjunctivae are normal.   Cardiovascular: Normal rate and regular rhythm. Pulmonary:      Effort: No respiratory distress.      Breath sounds: Normal breath sounds. No wheezing, rhonchi or rales.     Abdominal: Soft. There is abdominal tenderness in the epigastric area.   Musculoskeletal:      Cervical back: Neck supple.   Neurological: She is alert and oriented to person, place, and time. Gait normal.   Skin: Skin is warm and dry.   Psychiatric: Her behavior is normal. Mood normal.       Lab Results   Component Value Date    WBC 6.84 06/27/2024    HGB 14.8 06/27/2024    HCT 45.4 06/27/2024    MCV 86.8 06/27/2024     06/27/2024       CMP  Sodium   Date Value Ref Range Status   11/05/2024 130 (L) 135 - 145 mmol/L Final     Potassium   Date Value Ref Range Status   06/27/2024 3.8 3.5 - 5.1 mmol/L Final     Chloride   Date Value Ref Range Status   06/27/2024 105 98 - 107 mmol/L Final     CO2   Date Value Ref Range Status   06/27/2024 18 (L) 21 - 32 mmol/L Final     Glucose   Date Value Ref Range Status   06/27/2024 127 (H) 74 - 106 mg/dL Final     BUN   Date Value Ref Range Status   06/27/2024 7 7 - 18 mg/dL Final     Creatinine   Date Value Ref Range  Status   06/27/2024 1.15 (H) 0.55 - 1.02 mg/dL Final     Calcium   Date Value Ref Range Status   06/27/2024 8.9 8.5 - 10.1 mg/dL Final     Total Protein   Date Value Ref Range Status   06/27/2024 6.7 6.4 - 8.2 g/dL Final     Albumin   Date Value Ref Range Status   06/27/2024 3.8 3.5 - 5.0 g/dL Final     Bilirubin, Total   Date Value Ref Range Status   06/27/2024 0.5 >0.0 - 1.2 mg/dL Final     Alk Phos   Date Value Ref Range Status   06/27/2024 79 55 - 142 U/L Final     AST   Date Value Ref Range Status   06/27/2024 19 15 - 37 U/L Final     ALT   Date Value Ref Range Status   06/27/2024 20 13 - 56 U/L Final     Anion Gap   Date Value Ref Range Status   06/27/2024 16 7 - 16 mmol/L Final     eGFR   Date Value Ref Range Status   06/02/2022 60 >=60 mL/min/1.73m² Final     Lab Results   Component Value Date    TSH 6.750 (H) 06/27/2024     Lab Results   Component Value Date    CHOL 209 (H) 03/04/2024    CHOL 194 10/12/2023    CHOL 164 08/28/2022     Lab Results   Component Value Date    HDL 45 03/04/2024    HDL 45 10/12/2023    HDL 48 08/28/2022     Lab Results   Component Value Date    LDLCALC 92 03/04/2024    LDLCALC 108 10/12/2023    LDLCALC 76 08/28/2022     Lab Results   Component Value Date    TRIG 358 (H) 03/04/2024    TRIG 206 (H) 10/12/2023    TRIG 198 (H) 08/28/2022     Lab Results   Component Value Date    CHOLHDL 4.6 03/04/2024    CHOLHDL 4.3 10/12/2023    CHOLHDL 3.4 08/28/2022     Lab Results   Component Value Date    HGBA1C 5.6 06/27/2024         Assessment and Plan   Sinus congestion  -     POCT COVID-19 Rapid Screening  -     Cancel: POCT Influenza A/B Molecular  -     POCT Influenza A/B Molecular    Cough with exposure to severe acute respiratory syndrome coronavirus 2 (SARS-CoV-2)  -     POCT COVID-19 Rapid Screening  -     Cancel: POCT Influenza A/B Molecular  -     POCT Influenza A/B Molecular    Gastroesophageal reflux disease, unspecified whether esophagitis present  -     pantoprazole (PROTONIX) 20  MG tablet; Take 1 tablet (20 mg total) by mouth once daily.  Dispense: 90 tablet; Refill: 0    Pharyngitis, unspecified etiology          Patient Instructions  Patient Instructions   Refill provided on Protonix     Follow up in clinic in 7-10 days if symptoms persist, sooner if symptoms worsen

## 2025-01-08 NOTE — PATIENT INSTRUCTIONS
"Physical Therapy Evaluation/Treatment    Patient Name:  Babatunde Singh   MRN:  [id number]    Recommendations:     Discharge Recommendations:  nursing facility, skilled (return to)   Discharge Equipment Recommendations: none   Barriers to discharge: None    Assessment:     Babatunde Singh is a 69 y.o. male admitted with a medical diagnosis of Gastrointestinal hemorrhage.  He presents with the following impairments/functional limitations:  weakness, impaired endurance, impaired sensation, impaired self care skills, impaired functional mobility, gait instability, decreased lower extremity function, pain. Pt presents motivated and eager to participate with OOB mobility during therapy on this day. Pt requiring small amount of assist with bed mobility, transfers, gait, and stair navigation at this time. Pt with c/o RLE pain throughout, but not impairing functional mobility at this time. Recommend pt to return to SNF unit following stabilization of medical status to maximize improvements in endurance and LE strength and reduction of fall risk due to pt being the primary caregiver for spouse. Pt would continue to benefit from skilled acute PT in order to address current deficits and progress functional mobility.     Rehab Prognosis: Good; patient would benefit from acute skilled PT services to address these deficits and reach maximum level of function.    Recent Surgery: Procedure(s) (LRB):  EGD (ESOPHAGOGASTRODUODENOSCOPY) (N/A) 1 Day Post-Op    Plan:     During this hospitalization, patient to be seen 3 x/week to address the identified rehab impairments via gait training, therapeutic activities, therapeutic exercises, neuromuscular re-education and progress toward the following goals:    Plan of Care Expires:  11/19/22    Subjective     Chief Complaint: R leg pain  Patient/Family Comments/goals: "I want to go wherever y'all think is best"  Pain/Comfort:  Pain Rating 1:  (not rated)  Location - Side 1: Right  Location 1: " Refill provided on Protonix     Follow up in clinic in 7-10 days if symptoms persist, sooner if symptoms worsen        leg  Pain Addressed 1: Reposition, Distraction    Patients cultural, spiritual, Scientologist conflicts given the current situation: no    Living Environment:  Pt lives in [facility] with wife with 5 ALTAGRACIA and BHR. Home has a WIS with a bench and grab bars. Pt takes care of his wife but his family lives very close and visit often. 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  Prior to admission, patients level of function was independent with all ADLs and driving.  Equipment used at home: cane, straight, grab bar, shower chair.  DME owned (not currently used): rolling walker.  Upon discharge, patient will have assistance from family.    Objective:     Communicated with RN prior to session.  Patient found HOB elevated with telemetry  upon PT entry to room.    General Precautions: Standard, fall   Orthopedic Precautions:N/A   Braces: N/A  Respiratory Status: Room air    Exams:  Cognitive Exam:  Patient is oriented to Person, Place, Time, and Situation  Postural Exam:  Patient presented with the following abnormalities:    -       No postural abnormalities identified  Sensation:    -       Impaired  Pt reports B numbness and tingling in feet, but states this problem is chronic  RLE ROM: WNL  RLE Strength: 4/5  LLE ROM: WNL  LLE Strength: 4/5    Functional Mobility:  Bed Mobility:     Supine to Sit: supervision with HOB elevated  Sit to Supine: supervision with HOB flat  Transfers:     Sit to Stand:  supervision with no AD from EOB  Gait:   Pt ambulated ~200' SBA with inconsistent use of SPC. Pt with decreased marina, decreased step length, increased trunk flexion, and mild gait instability. Pt   Balance:   Static sitting: supervision at EOB  Static standing: SBA with no AD  Stairs:    Pt ascended/descended 5 stair(s) with No Assistive Device with bilateral handrails with Stand-by Assistance.   Pt ascended using reciprocal gait pattern, and descending using step to gait pattern with RLE leading.      AM-PAC 6 CLICK  MOBILITY  Total Score:        Treatment & Education:  Pt educated on PT role, goals of session, and POC. Pt verbalized understanding.  Pt educated on options for and benefits of continuing post-acute placement following d/c. Pt verbalized understanding.  Pt educated on proper technique for using SPC to maximize benefit. Pt verbalized understanding.    Gait training: Verbal cueing throughout for AD management, upright posture, increasing marina and step length, and importance of pacing to manage energy expenditure    Pt able to ambulate with 1 RN assist at this time.    Patient left HOB elevated with all lines intact and call button in reach.    GOALS:   Multidisciplinary Problems       Physical Therapy Goals          Problem: Physical Therapy    Goal Priority Disciplines Outcome Goal Variances Interventions   Physical Therapy Goal     PT, PT/OT Ongoing, Progressing     Description: Goals to be met by: 10/31/2022     Patient will increase functional independence with mobility by performin. Supine to sit with [place]  2. Sit to supine with [place]  3. Sit to stand transfer with [place]  4. Gait  x 250 feet with Modified [place] using LRAD.   5. Ascend/descend 5 stairs with bilateral Handrails Modified [place] using no AD.   6. Lower extremity exercise program x15 reps per handout, with independence                         History:     Past Medical History:   Diagnosis Date    DVT (deep venous thrombosis)     Hypertension     Peripheral arterial disease        Past Surgical History:   Procedure Laterality Date    CREATION OF AXILLARY-FEMORAL ARTERY BYPASS WITH GRAFT N/A 10/6/2022    Procedure: CREATION, BYPASS, ARTERIAL, AXILLARY TO FEMORAL, USING GRAFT;  Surgeon: LEI Ortiz III, MD;  Location: [facility] OR [address] [place] [address];  Service: Peripheral Vascular;  Laterality: N/A;    CREATION OF FEMORAL-FEMORAL ARTERY BYPASS WITH GRAFT N/A 10/6/2022    Procedure: CREATION, BYPASS, ARTERIAL, FEMORAL TO  FEMORAL, USING GRAFT;  Surgeon: LEI Ortiz III, MD;  Location: [facility] OR 2ND FLR;  Service: Peripheral Vascular;  Laterality: N/A;    ESOPHAGOGASTRODUODENOSCOPY N/A 10/20/2022    Procedure: EGD (ESOPHAGOGASTRODUODENOSCOPY);  Surgeon: Sixto Chicas MD;  Location: [facility] ENDO (2ND FLR);  Service: Endoscopy;  Laterality: N/A;    EXCISION OF HYDROCELE Left 4/26/2022    Procedure: HYDROCELECTOMY;  Surgeon: Damion Roberts MD;  Location: [facility];  Service: Urology;  Laterality: Left;    KNEE SURGERY  1972       Time Tracking:     PT Received On: 10/21/22  PT Start Time: 1009     PT Stop Time: 1035  PT Total Time (min): 26 min     Billable Minutes: Evaluation 16 and Gait Training 10      10/21/2022

## 2025-01-10 ENCOUNTER — DOCUMENT SCAN (OUTPATIENT)
Dept: HOME HEALTH SERVICES | Facility: HOSPITAL | Age: 70
End: 2025-01-10
Payer: MEDICARE

## 2025-01-27 ENCOUNTER — OFFICE VISIT (OUTPATIENT)
Dept: FAMILY MEDICINE | Facility: CLINIC | Age: 70
End: 2025-01-27
Payer: MEDICARE

## 2025-01-27 VITALS
OXYGEN SATURATION: 95 % | WEIGHT: 126.69 LBS | HEIGHT: 60 IN | RESPIRATION RATE: 13 BRPM | TEMPERATURE: 98 F | HEART RATE: 101 BPM | BODY MASS INDEX: 24.87 KG/M2 | SYSTOLIC BLOOD PRESSURE: 106 MMHG | DIASTOLIC BLOOD PRESSURE: 72 MMHG

## 2025-01-27 DIAGNOSIS — F20.9 SCHIZOPHRENIA, UNSPECIFIED TYPE: Primary | Chronic | ICD-10-CM

## 2025-01-27 DIAGNOSIS — K11.7 DRUG-INDUCED XEROSTOMIA: ICD-10-CM

## 2025-01-27 DIAGNOSIS — K44.9 HIATAL HERNIA: Chronic | ICD-10-CM

## 2025-01-27 DIAGNOSIS — T50.905A DRUG-INDUCED XEROSTOMIA: ICD-10-CM

## 2025-01-27 DIAGNOSIS — R10.13 EPIGASTRIC PAIN: ICD-10-CM

## 2025-01-27 DIAGNOSIS — R22.0 FACIAL SWELLING: ICD-10-CM

## 2025-01-27 PROCEDURE — 99214 OFFICE O/P EST MOD 30 MIN: CPT | Mod: ,,, | Performed by: NURSE PRACTITIONER

## 2025-01-27 RX ORDER — AMOXICILLIN 875 MG/1
875 TABLET, FILM COATED ORAL EVERY 12 HOURS
Qty: 14 TABLET | Refills: 0 | Status: SHIPPED | OUTPATIENT
Start: 2025-01-27 | End: 2025-02-03

## 2025-01-27 NOTE — PATIENT INSTRUCTIONS
Sugar free gum or sugar free hard candy may help with dry mouth; you can also try biotene oral spray which may be helpful   Artificial tears will help with dry eye   Augmentin as prescribed   Follow up in clinic 3-5 days if symptoms persist, sooner if symptoms worsen

## 2025-01-27 NOTE — PROGRESS NOTES
Clinic note     Patient name: Felicia Grijalva is a 69 y.o. female   Chief compliant   Chief Complaint   Patient presents with    Facial Swelling     Pt c/o lips swelling and felt numb last night, right side of cheek swelling.       Subjective     History of present illness   In clinic for evaluation of swelling to right face, states she had lip swelling last night   Denies any new foods, new medications, or any over the counter medications or herbal supplement use   She also reports epigastric pain, intermittent; has been seen by Dr Mcbride in the past      Past Medical History: hypothyroidism, GERD, schizophrenia, bipolar disorder      Psychiatry: [facility]     Sister present during clinic visit today           Social History     Tobacco Use    Smoking status: Never     Passive exposure: Past    Smokeless tobacco: Never   Substance Use Topics    Alcohol use: Never    Drug use: Never       Review of patient's allergies indicates:  No Known Allergies    Past Medical History:   Diagnosis Date    Bipolar disorder     GERD (gastroesophageal reflux disease)     Hypothyroidism     Schizophrenia, unspecified        Past Surgical History:   Procedure Laterality Date    HYSTERECTOMY      TONSILLECTOMY          No family history on file.      Current Outpatient Medications:     amoxicillin (AMOXIL) 875 MG tablet, Take 1 tablet (875 mg total) by mouth every 12 (twelve) hours. for 7 days, Disp: 14 tablet, Rfl: 0    levothyroxine (SYNTHROID) 75 MCG tablet, Take 1 tablet (75 mcg total) by mouth every morning., Disp: 90 tablet, Rfl: 0    pantoprazole (PROTONIX) 20 MG tablet, Take 1 tablet (20 mg total) by mouth once daily., Disp: 90 tablet, Rfl: 0    QUEtiapine (SEROQUEL) 100 MG Tab, Take 100 mg by mouth every morning., Disp: , Rfl:     QUEtiapine (SEROQUEL) 200 MG Tab, Take 200 mg by mouth every evening., Disp: , Rfl:     trihexyphenidyL (ARTANE) 2 MG tablet, Take 2 mg by mouth 3 (three) times daily with meals.,  Disp: , Rfl:     Review of Systems   Constitutional:  Negative for appetite change, chills, fatigue, fever and unexpected weight change.   HENT:  Positive for facial swelling.    Respiratory:  Negative for cough and shortness of breath.    Cardiovascular:  Negative for chest pain, palpitations and leg swelling.   Gastrointestinal:  Negative for abdominal pain, change in bowel habit, constipation, diarrhea, nausea and vomiting.   Genitourinary:  Negative for dysuria and frequency.   Musculoskeletal:  Negative for arthralgias, gait problem and myalgias.   Neurological:  Negative for dizziness, syncope, light-headedness and headaches.   Psychiatric/Behavioral:  Negative for dysphoric mood and sleep disturbance. The patient is not nervous/anxious.        Objective     /72 (Patient Position: Sitting)   Pulse 101   Temp 97.7 °F (36.5 °C)   Resp 13   Ht 5' (1.524 m)   Wt 57.5 kg (126 lb 11.2 oz)   SpO2 95%   BMI 24.74 kg/m²     Physical Exam   Constitutional: She is oriented to person, place, and time. She is cooperative. No distress.   HENT:   Head: Atraumatic.   Mouth/Throat: Mucous membranes are moist.   Cardiovascular: Normal rate and regular rhythm. Pulmonary:      Effort: Pulmonary effort is normal. No respiratory distress.      Breath sounds: Normal breath sounds. No wheezing, rhonchi or rales.     Musculoskeletal:         General: Normal range of motion.      Cervical back: Neck supple.      Right lower leg: No edema.      Left lower leg: No edema.   Neurological: She is alert and oriented to person, place, and time. Gait normal.   Skin: Skin is warm and dry.       Lab Results   Component Value Date    WBC 6.84 06/27/2024    HGB 14.8 06/27/2024    HCT 45.4 06/27/2024    MCV 86.8 06/27/2024     06/27/2024       CMP  Sodium   Date Value Ref Range Status   11/05/2024 130 (L) 135 - 145 mmol/L Final     Potassium   Date Value Ref Range Status   06/27/2024 3.8 3.5 - 5.1 mmol/L Final     Chloride    Date Value Ref Range Status   06/27/2024 105 98 - 107 mmol/L Final     CO2   Date Value Ref Range Status   06/27/2024 18 (L) 21 - 32 mmol/L Final     Glucose   Date Value Ref Range Status   06/27/2024 127 (H) 74 - 106 mg/dL Final     BUN   Date Value Ref Range Status   06/27/2024 7 7 - 18 mg/dL Final     Creatinine   Date Value Ref Range Status   06/27/2024 1.15 (H) 0.55 - 1.02 mg/dL Final     Calcium   Date Value Ref Range Status   06/27/2024 8.9 8.5 - 10.1 mg/dL Final     Total Protein   Date Value Ref Range Status   06/27/2024 6.7 6.4 - 8.2 g/dL Final     Albumin   Date Value Ref Range Status   06/27/2024 3.8 3.5 - 5.0 g/dL Final     Bilirubin, Total   Date Value Ref Range Status   06/27/2024 0.5 >0.0 - 1.2 mg/dL Final     Alk Phos   Date Value Ref Range Status   06/27/2024 79 55 - 142 U/L Final     AST   Date Value Ref Range Status   06/27/2024 19 15 - 37 U/L Final     ALT   Date Value Ref Range Status   06/27/2024 20 13 - 56 U/L Final     Anion Gap   Date Value Ref Range Status   06/27/2024 16 7 - 16 mmol/L Final     eGFR   Date Value Ref Range Status   06/02/2022 60 >=60 mL/min/1.73m² Final     Lab Results   Component Value Date    TSH 6.750 (H) 06/27/2024     Lab Results   Component Value Date    CHOL 209 (H) 03/04/2024    CHOL 194 10/12/2023    CHOL 164 08/28/2022     Lab Results   Component Value Date    HDL 45 03/04/2024    HDL 45 10/12/2023    HDL 48 08/28/2022     Lab Results   Component Value Date    LDLCALC 92 03/04/2024    LDLCALC 108 10/12/2023    LDLCALC 76 08/28/2022     Lab Results   Component Value Date    TRIG 358 (H) 03/04/2024    TRIG 206 (H) 10/12/2023    TRIG 198 (H) 08/28/2022     Lab Results   Component Value Date    CHOLHDL 4.6 03/04/2024    CHOLHDL 4.3 10/12/2023    CHOLHDL 3.4 08/28/2022     Lab Results   Component Value Date    HGBA1C 5.6 10/24/2024     IMPRESSION:Since the previous study of 12 October 2024 the platelike  atelectasis the right lung base is no longer present.    The  study is felt to be within the limits of normal in this time.  Narrative    HISTORY: Weakness    PREVIOUS: 12 October 2024    FINDINGS:The cardiovascular and mediastinal contours are unremarkable.    There is a moderately large hiatal hernia behind the heart.    The lung fields are clear.  Exam End: 11/04/24 21:57    Specimen Collected: 11/05/24 08:19        Assessment and Plan   Schizophrenia, unspecified type  Comments:  Psychiatry: [facility]    Drug-induced xerostomia    Facial swelling  -     amoxicillin (AMOXIL) 875 MG tablet; Take 1 tablet (875 mg total) by mouth every 12 (twelve) hours. for 7 days  Dispense: 14 tablet; Refill: 0    Epigastric pain  -     Ambulatory referral/consult to Gastroenterology; Future; Expected date: 02/03/2025    Hiatal hernia  -     Ambulatory referral/consult to Gastroenterology; Future; Expected date: 02/03/2025          Patient Instructions  Patient Instructions   Sugar free gum or sugar free hard candy may help with dry mouth; you can also try biotene oral spray which may be helpful   Artificial tears will help with dry eye   Augmentin as prescribed   Follow up in clinic 3-5 days if symptoms persist, sooner if symptoms worsen

## 2025-02-17 ENCOUNTER — OFFICE VISIT (OUTPATIENT)
Dept: FAMILY MEDICINE | Facility: CLINIC | Age: 70
End: 2025-02-17
Payer: MEDICARE

## 2025-02-17 VITALS
WEIGHT: 127 LBS | BODY MASS INDEX: 24.94 KG/M2 | DIASTOLIC BLOOD PRESSURE: 63 MMHG | SYSTOLIC BLOOD PRESSURE: 100 MMHG | HEART RATE: 96 BPM | HEIGHT: 60 IN | OXYGEN SATURATION: 97 %

## 2025-02-17 DIAGNOSIS — Z79.899 HIGH RISK MEDICATION USE: Chronic | ICD-10-CM

## 2025-02-17 DIAGNOSIS — E03.9 HYPOTHYROIDISM, UNSPECIFIED TYPE: Primary | Chronic | ICD-10-CM

## 2025-02-17 DIAGNOSIS — F20.9 SCHIZOPHRENIA, UNSPECIFIED TYPE: Chronic | ICD-10-CM

## 2025-02-17 LAB — TSH SERPL DL<=0.005 MIU/L-ACNC: 0.14 UIU/ML (ref 0.35–4.94)

## 2025-02-17 RX ORDER — LEVOTHYROXINE SODIUM 75 UG/1
75 TABLET ORAL EVERY MORNING
Qty: 90 TABLET | Refills: 0 | Status: CANCELLED | OUTPATIENT
Start: 2025-02-17

## 2025-02-17 RX ORDER — HYDROXYZINE PAMOATE 25 MG/1
25 CAPSULE ORAL 3 TIMES DAILY PRN
COMMUNITY
Start: 2025-02-05

## 2025-02-17 NOTE — PROGRESS NOTES
Clinic note     Patient name: Felicia Grijalva is a 69 y.o. female   Chief compliant   Chief Complaint   Patient presents with    Medication Refill     Here for med refill and lab work. C/o of some diarrhea lately, thinks it may be the milk.        Subjective     History of present illness   In clinic for routine follow up and medication refills     Past Medical History: hypothyroidism, GERD, schizophrenia, bipolar disorder      Psychiatry: [facility]      Sister present during clinic visit today             Social History[1]    Review of patient's allergies indicates:  No Known Allergies    Past Medical History:   Diagnosis Date    Bipolar disorder     GERD (gastroesophageal reflux disease)     Hypothyroidism     Schizophrenia, unspecified        Past Surgical History:   Procedure Laterality Date    HYSTERECTOMY      TONSILLECTOMY          No family history on file.    Current Medications[2]    Review of Systems   Constitutional:  Negative for appetite change, chills, fatigue, fever and unexpected weight change.   Respiratory:  Negative for cough and shortness of breath.    Cardiovascular:  Negative for chest pain, palpitations and leg swelling.   Gastrointestinal:  Positive for diarrhea. Negative for abdominal pain, change in bowel habit, constipation, nausea and vomiting.   Genitourinary:  Negative for dysuria and frequency.   Musculoskeletal:  Negative for arthralgias, gait problem and myalgias.   Neurological:  Negative for dizziness, syncope, light-headedness and headaches.   Psychiatric/Behavioral:  Negative for dysphoric mood and sleep disturbance. The patient is not nervous/anxious.        Objective     /63   Pulse 96   Ht 5' (1.524 m)   Wt 57.6 kg (127 lb)   SpO2 97%   BMI 24.80 kg/m²     Physical Exam   Constitutional: She is oriented to person, place, and time. No distress.   HENT:   Head: Atraumatic.   Mouth/Throat: Mucous membranes are moist.   Cardiovascular: Normal rate and  regular rhythm. Pulmonary:      Effort: Pulmonary effort is normal. No respiratory distress.      Breath sounds: Normal breath sounds. No wheezing, rhonchi or rales.     Abdominal: Soft. Bowel sounds are normal. She exhibits no distension. There is no abdominal tenderness.   Musculoskeletal:         General: Normal range of motion.      Cervical back: Neck supple.      Right lower leg: No edema.      Left lower leg: No edema.   Neurological: She is alert and oriented to person, place, and time. Gait normal.   Skin: Skin is warm and dry.   Psychiatric: Her behavior is normal. Mood normal.       Lab Results   Component Value Date    WBC 6.84 06/27/2024    HGB 14.8 06/27/2024    HCT 45.4 06/27/2024    MCV 86.8 06/27/2024     06/27/2024       CMP  Sodium   Date Value Ref Range Status   11/05/2024 130 (L) 135 - 145 mmol/L Final     Potassium   Date Value Ref Range Status   06/27/2024 3.8 3.5 - 5.1 mmol/L Final     Chloride   Date Value Ref Range Status   06/27/2024 105 98 - 107 mmol/L Final     CO2   Date Value Ref Range Status   06/27/2024 18 (L) 21 - 32 mmol/L Final     Glucose   Date Value Ref Range Status   06/27/2024 127 (H) 74 - 106 mg/dL Final     BUN   Date Value Ref Range Status   06/27/2024 7 7 - 18 mg/dL Final     Creatinine   Date Value Ref Range Status   06/27/2024 1.15 (H) 0.55 - 1.02 mg/dL Final     Calcium   Date Value Ref Range Status   06/27/2024 8.9 8.5 - 10.1 mg/dL Final     Total Protein   Date Value Ref Range Status   06/27/2024 6.7 6.4 - 8.2 g/dL Final     Albumin   Date Value Ref Range Status   06/27/2024 3.8 3.5 - 5.0 g/dL Final     Bilirubin, Total   Date Value Ref Range Status   06/27/2024 0.5 >0.0 - 1.2 mg/dL Final     Alk Phos   Date Value Ref Range Status   06/27/2024 79 55 - 142 U/L Final     AST   Date Value Ref Range Status   06/27/2024 19 15 - 37 U/L Final     ALT   Date Value Ref Range Status   06/27/2024 20 13 - 56 U/L Final     Anion Gap   Date Value Ref Range Status    06/27/2024 16 7 - 16 mmol/L Final     eGFR   Date Value Ref Range Status   06/02/2022 60 >=60 mL/min/1.73m² Final     Lab Results   Component Value Date    TSH 6.750 (H) 06/27/2024     Lab Results   Component Value Date    CHOL 209 (H) 03/04/2024    CHOL 194 10/12/2023    CHOL 164 08/28/2022     Lab Results   Component Value Date    HDL 45 03/04/2024    HDL 45 10/12/2023    HDL 48 08/28/2022     Lab Results   Component Value Date    LDLCALC 92 03/04/2024    LDLCALC 108 10/12/2023    LDLCALC 76 08/28/2022     Lab Results   Component Value Date    TRIG 358 (H) 03/04/2024    TRIG 206 (H) 10/12/2023    TRIG 198 (H) 08/28/2022     Lab Results   Component Value Date    CHOLHDL 4.6 03/04/2024    CHOLHDL 4.3 10/12/2023    CHOLHDL 3.4 08/28/2022     Lab Results   Component Value Date    HGBA1C 5.6 10/24/2024         Assessment and Plan   Hypothyroidism, unspecified type  -     TSH; Future; Expected date: 02/17/2025    High risk medication use    Schizophrenia, unspecified type  Comments:  Psychiatry: followed by [facility]          Patient Instructions  Patient Instructions   Lab obtained in clinic today, we will notify you of results and any necessary changes to plan of care   We will call you with those results and send in medication refills                                        [1]   Social History  Tobacco Use    Smoking status: Never     Passive exposure: Past    Smokeless tobacco: Never   Substance Use Topics    Alcohol use: Never    Drug use: Never   [2]   Current Outpatient Medications:     hydrOXYzine pamoate (VISTARIL) 25 MG Cap, Take 25 mg by mouth 3 (three) times daily as needed., Disp: , Rfl:     levothyroxine (SYNTHROID) 75 MCG tablet, Take 1 tablet (75 mcg total) by mouth every morning., Disp: 90 tablet, Rfl: 0    pantoprazole (PROTONIX) 20 MG tablet, Take 1 tablet (20 mg total) by mouth once daily., Disp: 90 tablet, Rfl: 0    QUEtiapine (SEROQUEL) 100 MG Tab, Take 100 mg by mouth every  morning., Disp: , Rfl:     QUEtiapine (SEROQUEL) 200 MG Tab, Take 200 mg by mouth every evening., Disp: , Rfl:     trihexyphenidyL (ARTANE) 2 MG tablet, Take 2 mg by mouth 3 (three) times daily with meals., Disp: , Rfl:

## 2025-02-17 NOTE — PATIENT INSTRUCTIONS
Lab obtained in clinic today, we will notify you of results and any necessary changes to plan of care   We will call you with those results and send in medication refills

## 2025-02-18 ENCOUNTER — RESULTS FOLLOW-UP (OUTPATIENT)
Dept: FAMILY MEDICINE | Facility: CLINIC | Age: 70
End: 2025-02-18
Payer: MEDICARE

## 2025-02-18 DIAGNOSIS — E03.9 HYPOTHYROIDISM, UNSPECIFIED TYPE: Primary | Chronic | ICD-10-CM

## 2025-02-18 RX ORDER — LEVOTHYROXINE SODIUM 50 UG/1
50 TABLET ORAL
Qty: 90 TABLET | Refills: 0 | Status: SHIPPED | OUTPATIENT
Start: 2025-02-18 | End: 2025-05-19

## 2025-02-24 ENCOUNTER — EXTERNAL HOME HEALTH (OUTPATIENT)
Dept: HOME HEALTH SERVICES | Facility: HOSPITAL | Age: 70
End: 2025-02-24
Payer: MEDICARE

## 2025-02-25 ENCOUNTER — DOCUMENT SCAN (OUTPATIENT)
Dept: HOME HEALTH SERVICES | Facility: HOSPITAL | Age: 70
End: 2025-02-25
Payer: MEDICARE

## 2025-04-02 ENCOUNTER — DOCUMENT SCAN (OUTPATIENT)
Dept: HOME HEALTH SERVICES | Facility: HOSPITAL | Age: 70
End: 2025-04-02
Payer: MEDICARE

## 2025-04-15 DIAGNOSIS — K21.9 GASTROESOPHAGEAL REFLUX DISEASE, UNSPECIFIED WHETHER ESOPHAGITIS PRESENT: ICD-10-CM

## 2025-04-15 RX ORDER — PANTOPRAZOLE SODIUM 20 MG/1
20 TABLET, DELAYED RELEASE ORAL DAILY
Qty: 90 TABLET | Refills: 0 | Status: SHIPPED | OUTPATIENT
Start: 2025-04-15

## 2025-04-15 NOTE — TELEPHONE ENCOUNTER
----- Message from Tech Vladimir sent at 4/15/2025 10:14 AM CDT -----  Needs a refill on pantoprazole. Send to [facility]

## 2025-04-28 ENCOUNTER — OFFICE VISIT (OUTPATIENT)
Dept: FAMILY MEDICINE | Facility: CLINIC | Age: 70
End: 2025-04-28
Payer: MEDICARE

## 2025-04-28 ENCOUNTER — EXTERNAL HOME HEALTH (OUTPATIENT)
Dept: HOME HEALTH SERVICES | Facility: HOSPITAL | Age: 70
End: 2025-04-28
Payer: MEDICARE

## 2025-04-28 VITALS
HEIGHT: 60 IN | SYSTOLIC BLOOD PRESSURE: 103 MMHG | WEIGHT: 132 LBS | HEART RATE: 103 BPM | BODY MASS INDEX: 25.91 KG/M2 | DIASTOLIC BLOOD PRESSURE: 68 MMHG

## 2025-04-28 DIAGNOSIS — T78.40XA ALLERGIC REACTION, INITIAL ENCOUNTER: Primary | ICD-10-CM

## 2025-04-28 PROCEDURE — 99213 OFFICE O/P EST LOW 20 MIN: CPT | Mod: ,,,

## 2025-04-28 PROCEDURE — 96372 THER/PROPH/DIAG INJ SC/IM: CPT | Mod: ,,,

## 2025-04-28 RX ORDER — FAMOTIDINE 40 MG/1
40 TABLET, FILM COATED ORAL DAILY
Qty: 5 TABLET | Refills: 0 | Status: SHIPPED | OUTPATIENT
Start: 2025-04-28 | End: 2026-04-28

## 2025-04-28 RX ORDER — PREDNISONE 20 MG/1
TABLET ORAL
Qty: 7 TABLET | Refills: 0 | Status: SHIPPED | OUTPATIENT
Start: 2025-04-28 | End: 2025-05-10

## 2025-04-28 RX ORDER — DEXAMETHASONE SODIUM PHOSPHATE 4 MG/ML
4 INJECTION, SOLUTION INTRA-ARTICULAR; INTRALESIONAL; INTRAMUSCULAR; INTRAVENOUS; SOFT TISSUE
Status: COMPLETED | OUTPATIENT
Start: 2025-04-28 | End: 2025-04-28

## 2025-04-28 RX ORDER — VALBENAZINE 40 MG/1
1 CAPSULE ORAL NIGHTLY
COMMUNITY
Start: 2025-04-10

## 2025-04-28 RX ORDER — OLANZAPINE 7.5 MG/1
7.5 TABLET, FILM COATED ORAL 4 TIMES DAILY
COMMUNITY
Start: 2025-04-22

## 2025-04-28 RX ADMIN — DEXAMETHASONE SODIUM PHOSPHATE 4 MG: 4 INJECTION, SOLUTION INTRA-ARTICULAR; INTRALESIONAL; INTRAMUSCULAR; INTRAVENOUS; SOFT TISSUE at 03:04

## 2025-04-28 NOTE — PROGRESS NOTES
Subjective     Patient ID: Felicia Grijalva is a 69 y.o. female.    Chief Complaint: Allergic Reaction, Facial Swelling, and Health Maintenance (Hepatitis C Screening Never done/TETANUS VACCINE Never done/Mammogram Never done/DEXA Scan Never done/Colorectal Cancer Screening Never done/Shingles Vaccine(1 of 2) Never done/Pneumococcal Vaccines (Age 50+)(3 of 3 - PCV20 or PCV21) due on 10/19/2022/COVID-19 Vaccine(1 - 2024-25 season) Never done/Pt will address at well visit)    Presents to clinic for facial swelling and hives that started after dose increase of olanzapine on Friday. Recently started medication 1 month ago. Patient is followed by dr. Pearson at [facility]. She has had some improvement in symptoms with antihistamine given by family last night. Family had picture of her facial swelling from last night that  was significant around her eyes and mouth.     Allergic Reaction  Associated symptoms include a rash. Pertinent negatives include no abdominal pain, coughing, vomiting or wheezing.     Review of Systems   Constitutional:  Negative for chills, fatigue and fever.   HENT:  Positive for facial swelling. Negative for nasal congestion, ear discharge, ear pain, rhinorrhea, sinus pressure/congestion, sneezing and sore throat.    Eyes:  Negative for visual disturbance.   Respiratory:  Negative for cough, chest tightness, shortness of breath and wheezing.    Gastrointestinal:  Negative for abdominal pain, nausea and vomiting.   Genitourinary:  Negative for decreased urine volume.   Musculoskeletal:  Negative for myalgias.   Integumentary:  Positive for rash.   Neurological:  Negative for weakness and headaches.   Hematological:  Negative for adenopathy.          Objective     Physical Exam  Vitals and nursing note reviewed.   Constitutional:       Appearance: Normal appearance. She is normal weight.   HENT:      Nose: No congestion.      Mouth/Throat:      Mouth: Mucous membranes are moist.      Pharynx:  Oropharynx is clear. No posterior oropharyngeal erythema.   Eyes:      Comments: Minimal periorbital edema   Cardiovascular:      Rate and Rhythm: Normal rate and regular rhythm.      Pulses: Normal pulses.      Heart sounds: Normal heart sounds.   Pulmonary:      Effort: Pulmonary effort is normal.      Breath sounds: Normal breath sounds.   Abdominal:      General: Abdomen is flat. Bowel sounds are normal.      Palpations: Abdomen is soft.   Musculoskeletal:         General: Normal range of motion.      Cervical back: Normal range of motion and neck supple.   Skin:     General: Skin is warm and dry.      Capillary Refill: Capillary refill takes less than 2 seconds.      Findings: Rash present. Erythema: upper extremities bilaterally. trace remenants of hives.  Neurological:      General: No focal deficit present.      Mental Status: She is alert and oriented to person, place, and time.   Psychiatric:         Mood and Affect: Mood normal.         Behavior: Behavior normal.         Thought Content: Thought content normal.         Judgment: Judgment normal.            Assessment and Plan     1. Allergic reaction, initial encounter  -     dexAMETHasone injection 4 mg  -     predniSONE (DELTASONE) 20 MG tablet; Take 1 tablet (20 mg total) by mouth once daily for 7 days, THEN 0.5 tablets (10 mg total) once daily for 5 days.  Dispense: 7 tablet; Refill: 0  -     famotidine (PEPCID) 40 MG tablet; Take 1 tablet (40 mg total) by mouth once daily.  Dispense: 5 tablet; Refill: 0        Meds as directed. Recommend discussing stopping offending medication with Dr. Pearson. Call clinic in morning.   Go to ER for new or worsening symptoms.        Follow up in about 2 weeks (around 5/12/2025).      I spent a total of 15 minutes on the day of the visit.This includes face to face time and non-face to face time preparing to see the patient (eg, review of tests), obtaining and/or reviewing separately obtained history, documenting  clinical information in the electronic or other health record, independently interpreting results and communicating results to the patient/family/caregiver, or care coordinator.    TENA Desai

## 2025-05-14 ENCOUNTER — OFFICE VISIT (OUTPATIENT)
Dept: FAMILY MEDICINE | Facility: CLINIC | Age: 70
End: 2025-05-14
Payer: MEDICARE

## 2025-05-14 VITALS
WEIGHT: 134.69 LBS | HEIGHT: 60 IN | HEART RATE: 92 BPM | BODY MASS INDEX: 26.44 KG/M2 | DIASTOLIC BLOOD PRESSURE: 70 MMHG | SYSTOLIC BLOOD PRESSURE: 110 MMHG | OXYGEN SATURATION: 98 %

## 2025-05-14 DIAGNOSIS — Z79.899 HIGH RISK MEDICATION USE: ICD-10-CM

## 2025-05-14 DIAGNOSIS — F20.9 SCHIZOPHRENIA, UNSPECIFIED TYPE: Chronic | ICD-10-CM

## 2025-05-14 DIAGNOSIS — E03.9 HYPOTHYROIDISM, UNSPECIFIED TYPE: Primary | Chronic | ICD-10-CM

## 2025-05-14 PROCEDURE — 84443 ASSAY THYROID STIM HORMONE: CPT | Mod: ,,, | Performed by: CLINICAL MEDICAL LABORATORY

## 2025-05-14 PROCEDURE — 80053 COMPREHEN METABOLIC PANEL: CPT | Mod: ,,, | Performed by: CLINICAL MEDICAL LABORATORY

## 2025-05-14 PROCEDURE — 85025 COMPLETE CBC W/AUTO DIFF WBC: CPT | Mod: ,,, | Performed by: CLINICAL MEDICAL LABORATORY

## 2025-05-14 PROCEDURE — 99214 OFFICE O/P EST MOD 30 MIN: CPT | Mod: ,,, | Performed by: NURSE PRACTITIONER

## 2025-05-14 RX ORDER — PANTOPRAZOLE SODIUM 40 MG/1
40 TABLET, DELAYED RELEASE ORAL DAILY
COMMUNITY
Start: 2025-05-13

## 2025-05-14 NOTE — PROGRESS NOTES
Clinic note     Patient name: Felicia Grijalva is a 69 y.o. female   Chief compliant   Chief Complaint   Patient presents with    Medication Refill     Here for 3 month follow up with med refills. No problems at this time. Has appt tomorrow with Dr. Pearson at [facility] for mental health.        Subjective     History of present illness   In clinic for routine follow up and medication refills     Past Medical History: hypothyroidism, GERD, schizophrenia, bipolar disorder      Psychiatry: Follow up tomorrow with Dr Pearson at [facility] clinic      Sister present during clinic visit today           Social History[1]    Review of patient's allergies indicates:  No Known Allergies    Past Medical History:   Diagnosis Date    Bipolar disorder     GERD (gastroesophageal reflux disease)     Hypothyroidism     Schizophrenia, unspecified        Past Surgical History:   Procedure Laterality Date    HYSTERECTOMY      TONSILLECTOMY          No family history on file.    Current Medications[2]    Review of Systems   Constitutional:  Negative for appetite change, chills, fatigue, fever and unexpected weight change.   Respiratory:  Negative for cough and shortness of breath.    Cardiovascular:  Negative for chest pain, palpitations and leg swelling.   Gastrointestinal:  Negative for abdominal pain, change in bowel habit, constipation, diarrhea, nausea and vomiting.   Genitourinary:  Negative for dysuria and frequency.   Musculoskeletal:  Negative for arthralgias, gait problem and myalgias.   Neurological:  Negative for dizziness, syncope, light-headedness and headaches.   Psychiatric/Behavioral:  Positive for hallucinations (schizophrenia).        Objective     /70 (Patient Position: Sitting)   Pulse 92   Ht 5' (1.524 m)   Wt 61.1 kg (134 lb 11.2 oz)   SpO2 98%   BMI 26.31 kg/m²     Physical Exam   Constitutional: She is oriented to person, place, and time. She is cooperative.  No distress.   HENT:   Head: Atraumatic.   Mouth/Throat: Mucous membranes are moist.   Cardiovascular: Normal rate and regular rhythm. Pulmonary:      Effort: Pulmonary effort is normal. No respiratory distress.      Breath sounds: Normal breath sounds. No wheezing, rhonchi or rales.     Abdominal: Soft. Bowel sounds are normal. She exhibits no distension. There is no abdominal tenderness.   Musculoskeletal:         General: Normal range of motion.      Cervical back: Neck supple.      Right lower leg: No edema.      Left lower leg: No edema.   Neurological: She is alert and oriented to person, place, and time. Gait normal.   Tardive dyskinesia    Skin: Skin is warm and dry.   Psychiatric: Her speech is normal and behavior is normal. Her mood appears anxious. She expresses no homicidal and no suicidal ideation. She expresses no suicidal plans and no homicidal plans.       Lab Results   Component Value Date    WBC 6.84 06/27/2024    HGB 14.8 06/27/2024    HCT 45.4 06/27/2024    MCV 86.8 06/27/2024     06/27/2024       CMP  Sodium   Date Value Ref Range Status   11/05/2024 130 (L) 135 - 145 mmol/L Final     Potassium   Date Value Ref Range Status   06/27/2024 3.8 3.5 - 5.1 mmol/L Final     Chloride   Date Value Ref Range Status   06/27/2024 105 98 - 107 mmol/L Final     CO2   Date Value Ref Range Status   06/27/2024 18 (L) 21 - 32 mmol/L Final     Glucose   Date Value Ref Range Status   06/27/2024 127 (H) 74 - 106 mg/dL Final     BUN   Date Value Ref Range Status   06/27/2024 7 7 - 18 mg/dL Final     Creatinine   Date Value Ref Range Status   06/27/2024 1.15 (H) 0.55 - 1.02 mg/dL Final     Calcium   Date Value Ref Range Status   06/27/2024 8.9 8.5 - 10.1 mg/dL Final     Total Protein   Date Value Ref Range Status   06/27/2024 6.7 6.4 - 8.2 g/dL Final     Albumin   Date Value Ref Range Status   06/27/2024 3.8 3.5 - 5.0 g/dL Final     Bilirubin, Total   Date Value Ref Range Status   06/27/2024 0.5 >0.0 - 1.2  mg/dL Final     Alk Phos   Date Value Ref Range Status   06/27/2024 79 55 - 142 U/L Final     AST   Date Value Ref Range Status   06/27/2024 19 15 - 37 U/L Final     ALT   Date Value Ref Range Status   06/27/2024 20 13 - 56 U/L Final     Anion Gap   Date Value Ref Range Status   06/27/2024 16 7 - 16 mmol/L Final     eGFR   Date Value Ref Range Status   06/02/2022 60 >=60 mL/min/1.73m² Final     Lab Results   Component Value Date    TSH 0.139 (L) 02/17/2025     Lab Results   Component Value Date    CHOL 209 (H) 03/04/2024    CHOL 194 10/12/2023    CHOL 164 08/28/2022     Lab Results   Component Value Date    HDL 45 03/04/2024    HDL 45 10/12/2023    HDL 48 08/28/2022     Lab Results   Component Value Date    LDLCALC 92 03/04/2024    LDLCALC 108 10/12/2023    LDLCALC 76 08/28/2022     Lab Results   Component Value Date    TRIG 358 (H) 03/04/2024    TRIG 206 (H) 10/12/2023    TRIG 198 (H) 08/28/2022     Lab Results   Component Value Date    CHOLHDL 4.6 03/04/2024    CHOLHDL 4.3 10/12/2023    CHOLHDL 3.4 08/28/2022     Lab Results   Component Value Date    HGBA1C 5.6 10/24/2024       Lab Results   Component Value Date    [id number] Negative 01/08/2025    FLUAMOLEC Negative 01/08/2025    FLUBMOLEC Negative 01/08/2025     Any diagnostic testing results obtained in office or prior to appointment were reviewed were reviewed with patient.    Health Maintenance Due   Topic Date Due    Hepatitis C Screening  Never done    TETANUS VACCINE  Never done    Mammogram  Never done    DEXA Scan  Never done    Colorectal Cancer Screening  Never done    Shingles Vaccine (1 of 2) Never done    Pneumococcal Vaccines (Age 50+) (3 of 3 - PCV20 or PCV21) 10/19/2022    COVID-19 Vaccine (1 - 2024-25 season) Never done         Health Maintenance Topics with due status: Not Due       Topic Last Completion Date    Lipid Panel 11/07/2024    RSV Vaccine (Age 60+ and Pregnant patients) Not Due         Assessment and Plan   Hypothyroidism,  unspecified type  -     TSH; Future; Expected date: 05/14/2025    Schizophrenia, unspecified type    High risk medication use  -     CBC Auto Differential; Future; Expected date: 05/14/2025  -     Comprehensive Metabolic Panel; Future; Expected date: 05/14/2025          Patient Instructions  Patient Instructions   Lab obtained in clinic today, we will notify you of results and any necessary changes to plan of care   Follow up on 11/3/2025  and as needed; routine medication will be due 11/10/2025    Will send in refill on levothyroxine after review of lab results                                    [1]   Social History  Tobacco Use    Smoking status: Never     Passive exposure: Past    Smokeless tobacco: Never   Substance Use Topics    Alcohol use: Never    Drug use: Never   [2]   Current Outpatient Medications:     famotidine (PEPCID) 40 MG tablet, Take 1 tablet (40 mg total) by mouth once daily., Disp: 5 tablet, Rfl: 0    hydrOXYzine pamoate (VISTARIL) 25 MG Cap, Take 25 mg by mouth 3 (three) times daily as needed., Disp: , Rfl:     levothyroxine (SYNTHROID) 50 MCG tablet, Take 1 tablet (50 mcg total) by mouth before breakfast., Disp: 90 tablet, Rfl: 0    OLANZapine (ZYPREXA) 7.5 MG tablet, Take 7.5 mg by mouth 4 (four) times daily., Disp: , Rfl:     pantoprazole (PROTONIX) 40 MG tablet, Take 40 mg by mouth once daily., Disp: , Rfl:     QUEtiapine (SEROQUEL) 100 MG Tab, Take 100 mg by mouth every morning., Disp: , Rfl:     QUEtiapine (SEROQUEL) 200 MG Tab, Take 200 mg by mouth every evening., Disp: , Rfl:     INGREZZA 40 mg Cap, Take 1 capsule by mouth nightly. (Patient not taking: Reported on 5/14/2025), Disp: , Rfl:     trihexyphenidyL (ARTANE) 2 MG tablet, Take 2 mg by mouth 3 (three) times daily with meals. (Patient not taking: Reported on 4/28/2025), Disp: , Rfl:

## 2025-05-14 NOTE — PATIENT INSTRUCTIONS
Lab obtained in clinic today, we will notify you of results and any necessary changes to plan of care   Follow up on 11/3/2025  and as needed; routine medication will be due 11/10/2025    Will send in refill on levothyroxine after review of lab results

## 2025-05-15 LAB
ALBUMIN SERPL BCP-MCNC: 3.7 G/DL (ref 3.4–4.8)
ALBUMIN/GLOB SERPL: 1.3 {RATIO}
ALP SERPL-CCNC: 72 U/L (ref 40–150)
ALT SERPL W P-5'-P-CCNC: 13 U/L
ANION GAP SERPL CALCULATED.3IONS-SCNC: 13 MMOL/L (ref 7–16)
ANISOCYTOSIS BLD QL SMEAR: ABNORMAL
AST SERPL W P-5'-P-CCNC: 15 U/L (ref 11–45)
BASOPHILS # BLD AUTO: 0.03 K/UL (ref 0–0.2)
BASOPHILS NFR BLD AUTO: 0.5 % (ref 0–1)
BILIRUB SERPL-MCNC: 0.2 MG/DL
BUN SERPL-MCNC: 14 MG/DL (ref 10–20)
BUN/CREAT SERPL: 16 (ref 6–20)
CALCIUM SERPL-MCNC: 8.5 MG/DL (ref 8.4–10.2)
CHLORIDE SERPL-SCNC: 108 MMOL/L (ref 98–107)
CO2 SERPL-SCNC: 22 MMOL/L (ref 23–31)
CREAT SERPL-MCNC: 0.85 MG/DL (ref 0.55–1.02)
DIFFERENTIAL METHOD BLD: ABNORMAL
EGFR (NO RACE VARIABLE) (RUSH/TITUS): 73 ML/MIN/1.73M2
EOSINOPHIL # BLD AUTO: 0 K/UL (ref 0–0.5)
EOSINOPHIL NFR BLD AUTO: 0 % (ref 1–4)
ERYTHROCYTE [DISTWIDTH] IN BLOOD BY AUTOMATED COUNT: 15.2 % (ref 11.5–14.5)
GLOBULIN SER-MCNC: 2.8 G/DL (ref 2–4)
GLUCOSE SERPL-MCNC: 129 MG/DL (ref 82–115)
HCT VFR BLD AUTO: 33.5 % (ref 38–47)
HGB BLD-MCNC: 9.9 G/DL (ref 12–16)
IMM GRANULOCYTES # BLD AUTO: 0.12 K/UL (ref 0–0.04)
IMM GRANULOCYTES NFR BLD: 1.9 % (ref 0–0.4)
LYMPHOCYTES # BLD AUTO: 1.77 K/UL (ref 1–4.8)
LYMPHOCYTES NFR BLD AUTO: 28.4 % (ref 27–41)
MCH RBC QN AUTO: 22.9 PG (ref 27–31)
MCHC RBC AUTO-ENTMCNC: 29.6 G/DL (ref 32–36)
MCV RBC AUTO: 77.4 FL (ref 80–96)
MONOCYTES # BLD AUTO: 0.44 K/UL (ref 0–0.8)
MONOCYTES NFR BLD AUTO: 7.1 % (ref 2–6)
MPC BLD CALC-MCNC: ABNORMAL G/DL
NEUTROPHILS # BLD AUTO: 3.87 K/UL (ref 1.8–7.7)
NEUTROPHILS NFR BLD AUTO: 62.1 % (ref 53–65)
NRBC # BLD AUTO: 0 X10E3/UL
NRBC, AUTO (.00): 0 %
PLATELET # BLD AUTO: 183 K/UL (ref 150–400)
PLATELET MORPHOLOGY: ABNORMAL
POTASSIUM SERPL-SCNC: 4.1 MMOL/L (ref 3.5–5.1)
PROT SERPL-MCNC: 6.5 G/DL (ref 5.8–7.6)
RBC # BLD AUTO: 4.33 M/UL (ref 4.2–5.4)
SODIUM SERPL-SCNC: 139 MMOL/L (ref 136–145)
TSH SERPL DL<=0.005 MIU/L-ACNC: 3.45 UIU/ML (ref 0.35–4.94)
WBC # BLD AUTO: 6.23 K/UL (ref 4.5–11)

## 2025-05-19 ENCOUNTER — RESULTS FOLLOW-UP (OUTPATIENT)
Dept: FAMILY MEDICINE | Facility: CLINIC | Age: 70
End: 2025-05-19
Payer: MEDICARE

## 2025-05-19 DIAGNOSIS — D64.9 LOW HEMOGLOBIN AND LOW HEMATOCRIT: ICD-10-CM

## 2025-05-19 DIAGNOSIS — E03.9 HYPOTHYROIDISM, UNSPECIFIED TYPE: Chronic | ICD-10-CM

## 2025-05-19 DIAGNOSIS — R73.9 HYPERGLYCEMIA: Primary | ICD-10-CM

## 2025-05-19 RX ORDER — LEVOTHYROXINE SODIUM 50 UG/1
50 TABLET ORAL
Qty: 90 TABLET | Refills: 1 | Status: SHIPPED | OUTPATIENT
Start: 2025-05-19

## 2025-05-21 ENCOUNTER — DOCUMENT SCAN (OUTPATIENT)
Dept: HOME HEALTH SERVICES | Facility: HOSPITAL | Age: 70
End: 2025-05-21
Payer: MEDICARE

## 2025-05-30 ENCOUNTER — OFFICE VISIT (OUTPATIENT)
Dept: FAMILY MEDICINE | Facility: CLINIC | Age: 70
End: 2025-05-30
Payer: MEDICARE

## 2025-05-30 VITALS
BODY MASS INDEX: 26.23 KG/M2 | SYSTOLIC BLOOD PRESSURE: 101 MMHG | HEIGHT: 60 IN | HEART RATE: 92 BPM | DIASTOLIC BLOOD PRESSURE: 49 MMHG | WEIGHT: 133.63 LBS

## 2025-05-30 DIAGNOSIS — K04.7 DENTAL INFECTION: ICD-10-CM

## 2025-05-30 DIAGNOSIS — R22.0 LIP SWELLING: Primary | ICD-10-CM

## 2025-05-30 PROCEDURE — 86160 COMPLEMENT ANTIGEN: CPT | Mod: ,,, | Performed by: CLINICAL MEDICAL LABORATORY

## 2025-05-30 RX ORDER — AMOXICILLIN 500 MG/1
500 TABLET, FILM COATED ORAL EVERY 12 HOURS
Qty: 20 TABLET | Refills: 0 | Status: SHIPPED | OUTPATIENT
Start: 2025-05-30 | End: 2025-06-09

## 2025-05-30 RX ORDER — PREDNISONE 20 MG/1
20 TABLET ORAL DAILY
Qty: 7 TABLET | Refills: 0 | Status: SHIPPED | OUTPATIENT
Start: 2025-05-30

## 2025-05-30 RX ORDER — AMOXICILLIN 500 MG/1
500 TABLET, FILM COATED ORAL EVERY 12 HOURS
Qty: 20 TABLET | Refills: 0 | Status: SHIPPED | OUTPATIENT
Start: 2025-05-30 | End: 2025-05-30

## 2025-05-30 NOTE — PROGRESS NOTES
Subjective     Patient ID: Felicia Grijalva is a 69 y.o. female.    Chief Complaint: Oral Swelling    History of Present Illness    CHIEF COMPLAINT:  Patient presents today for lip swelling    HISTORY OF PRESENT ILLNESS:  She presents with swelling of the top lip. She has been using ice packs throughout the morning for symptom management. She consumed a chocolate shake yesterday. She denies experiencing mouth itching.      MEDICATIONS:  She takes olanzapine 5 mg nightly and recently took ibuprofen last night and woke with upper lip swollen.            Review of Systems   Constitutional:  Negative for appetite change, chills and fever.   HENT:  Positive for facial swelling (upper lip). Negative for congestion, ear discharge, ear pain, postnasal drip, rhinorrhea, sinus pressure, sinus pain, sneezing and sore throat.    Eyes:  Negative for visual disturbance.   Respiratory:  Negative for cough, shortness of breath and wheezing.    Gastrointestinal:  Negative for abdominal pain, diarrhea, nausea and vomiting.   Genitourinary:  Negative for decreased urine volume.   Musculoskeletal:  Negative for myalgias, neck pain and neck stiffness.   Skin:  Negative for rash.   Neurological:  Negative for dizziness and headaches.   Hematological:  Negative for adenopathy.         Vital Signs  Pulse: 92  BP: (!) 101/49  Pain Score: 0-No pain  Height and Weight  Height: 5' (152.4 cm)  Weight: 60.6 kg (133 lb 9.6 oz)  BSA (Calculated - sq m): 1.6 sq meters  BMI (Calculated): 26.1  Weight in (lb) to have BMI = 25: 127.7]    Physical Exam  Vitals and nursing note reviewed.   Constitutional:       Appearance: Normal appearance. She is normal weight.   HENT:      Mouth/Throat:      Mouth: Mucous membranes are moist.      Dentition: Abnormal dentition. Has dentures. Gingival swelling and dental caries present.      Tongue: No lesions. Tongue does not deviate from midline.      Palate: No mass and lesions.      Comments: Mild upper lip  swelling  Cardiovascular:      Rate and Rhythm: Normal rate and regular rhythm.      Pulses: Normal pulses.      Heart sounds: Normal heart sounds.   Pulmonary:      Effort: Pulmonary effort is normal.      Breath sounds: Normal breath sounds.   Abdominal:      General: Abdomen is flat. Bowel sounds are normal.      Palpations: Abdomen is soft.   Musculoskeletal:         General: Normal range of motion.      Cervical back: Normal range of motion and neck supple.   Skin:     General: Skin is warm and dry.      Capillary Refill: Capillary refill takes less than 2 seconds.   Neurological:      General: No focal deficit present.      Mental Status: She is alert and oriented to person, place, and time.   Psychiatric:         Mood and Affect: Mood normal.         Behavior: Behavior normal.         Thought Content: Thought content normal.         Judgment: Judgment normal.            Assessment and Plan     1. Lip swelling  -     C4 Complement; Future; Expected date: 05/30/2025  -     C 1 Esterase Inhibitor Antigen; Future; Expected date: 05/30/2025    2. Dental infection  -     amoxicillin (AMOXIL) 500 MG Tab; Take 1 tablet (500 mg total) by mouth every 12 (twelve) hours. for 10 days  Dispense: 20 tablet; Refill: 0    Other orders    -     predniSONE (DELTASONE) 20 MG tablet; Take 1 tablet (20 mg total) by mouth once daily.  Dispense: 7 tablet; Refill: 0      Assessment & Plan    S00.501A Unspecified superficial injury of lip, initial encounter  K13.0 Diseases of lips  T78.40XA Allergy, unspecified, initial encounter  Z91.018 Allergy to other foods  K12.2 Cellulitis and abscess of mouth  T88.7XXA Unspecified adverse effect of drug or medicament, initial encounter    LIP SWELLING:  - Evaluated patient's lip swelling, which has decreased in severity since onset with patient's self-application of ice packs this morning.  - Instructed to continue ice pack application to affected areas.  - Considered potential causes including  allergic reaction and idiopathic angioedema triggered by infection or inflammatory processes.  - Prescribed steroids to reduce inflammation.  - Ordered C4 and C1 levels to investigate possibility of hereditary angioedema.  - Instructed patient to monitor for any breathing difficulties or wheezing and seek immediate medical attention if these occur.    ALLERGY (UNSPECIFIED AND FOOD):  - Noted lip swelling possibly related to chocolate consumption.  - Educated patient on angioedema, its potential causes, and the difficulty in pinpointing exact triggers in a single visit.  - Explained that new allergies can develop after repeated exposure.  - Advised monitoring for symptoms upon future consumption of chocolate or other potential allergens.  - Discussed potential referral to allergy specialist and allergy testing if symptoms persist.    DENTAL ISSUES:  - Observed swollen gums that may indicate infection possibly extending to the lip.  - Considered dental evaluation and antibiotic therapy if necessary.  - Recommend referral to dentist for further evaluation of gum swelling and potential infection.    MEDICATION-RELATED EFFECTS:  - Evaluated for medication-related angioedema, noting that facial swelling is a known side effect of olanzapine.  - Also discussed that NSAIDs like ibuprofen can potentially cause angioedema.  - Recommend continuing olanzapine 5 mg nightly as swelling is not occurring daily, but advised monitoring and consulting with Dr. Pearson if symptoms persist or worsen.  - Discontinued ibuprofen and prescribed Tylenol for pain relief.                  No follow-ups on file.    This note was generated with the assistance of ambient listening technology. Verbal consent was obtained by the patient and accompanying visitor(s) for the recording of patient appointment to facilitate this note. I attest to having reviewed and edited the generated note for accuracy, though some syntax or spelling errors may  persist. Please contact the author of this note for any clarification.         I spent a total of 20 minutes on the day of the visit.This includes face to face time and non-face to face time preparing to see the patient (eg, review of tests), obtaining and/or reviewing separately obtained history, documenting clinical information in the electronic or other health record, independently interpreting results and communicating results to the patient/family/caregiver, or care coordinator.    TENA Desai

## 2025-05-31 LAB — C4 SERPL-MCNC: 23 MG/DL (ref 13–46)

## 2025-07-01 ENCOUNTER — HOSPITAL ENCOUNTER (EMERGENCY)
Facility: HOSPITAL | Age: 70
Discharge: HOME | End: 2025-07-01
Payer: MEDICARE

## 2025-07-01 ENCOUNTER — OFFICE VISIT (OUTPATIENT)
Dept: FAMILY MEDICINE | Facility: CLINIC | Age: 70
End: 2025-07-01
Payer: MEDICARE

## 2025-07-01 VITALS
SYSTOLIC BLOOD PRESSURE: 122 MMHG | HEART RATE: 93 BPM | HEIGHT: 60 IN | DIASTOLIC BLOOD PRESSURE: 85 MMHG | WEIGHT: 136 LBS | BODY MASS INDEX: 26.7 KG/M2 | OXYGEN SATURATION: 96 %

## 2025-07-01 VITALS
RESPIRATION RATE: 20 BRPM | OXYGEN SATURATION: 97 % | HEIGHT: 61 IN | SYSTOLIC BLOOD PRESSURE: 110 MMHG | DIASTOLIC BLOOD PRESSURE: 58 MMHG | BODY MASS INDEX: 25.68 KG/M2 | HEART RATE: 96 BPM | TEMPERATURE: 98 F | WEIGHT: 136 LBS

## 2025-07-01 DIAGNOSIS — R07.89 CHEST PRESSURE: ICD-10-CM

## 2025-07-01 DIAGNOSIS — T78.40XA ALLERGIC REACTION, INITIAL ENCOUNTER: Primary | ICD-10-CM

## 2025-07-01 DIAGNOSIS — J02.9 PHARYNGITIS, UNSPECIFIED ETIOLOGY: ICD-10-CM

## 2025-07-01 DIAGNOSIS — T78.40XD ALLERGIC REACTION, SUBSEQUENT ENCOUNTER: Primary | ICD-10-CM

## 2025-07-01 LAB
ALBUMIN SERPL BCP-MCNC: 3.8 G/DL (ref 3.4–4.8)
ALBUMIN/GLOB SERPL: 1.6 {RATIO}
ALP SERPL-CCNC: 85 U/L (ref 40–150)
ALT SERPL W P-5'-P-CCNC: 8 U/L
ANION GAP SERPL CALCULATED.3IONS-SCNC: 16 MMOL/L (ref 7–16)
ANISOCYTOSIS BLD QL SMEAR: ABNORMAL
AST SERPL W P-5'-P-CCNC: 21 U/L (ref 11–45)
BASOPHILS # BLD AUTO: 0.01 K/UL (ref 0–0.2)
BASOPHILS NFR BLD AUTO: 0.1 % (ref 0–1)
BILIRUB SERPL-MCNC: 0.3 MG/DL
BUN SERPL-MCNC: 12 MG/DL (ref 10–20)
BUN/CREAT SERPL: 12 (ref 6–20)
CALCIUM SERPL-MCNC: 8.8 MG/DL (ref 8.4–10.2)
CHLORIDE SERPL-SCNC: 106 MMOL/L (ref 98–107)
CO2 SERPL-SCNC: 22 MMOL/L (ref 23–31)
CREAT SERPL-MCNC: 1.02 MG/DL (ref 0.55–1.02)
DIFFERENTIAL METHOD BLD: ABNORMAL
EGFR (NO RACE VARIABLE) (RUSH/TITUS): 59 ML/MIN/1.73M2
EOSINOPHIL # BLD AUTO: 0.02 K/UL (ref 0–0.5)
EOSINOPHIL NFR BLD AUTO: 0.3 % (ref 1–4)
ERYTHROCYTE [DISTWIDTH] IN BLOOD BY AUTOMATED COUNT: 17.2 % (ref 11.5–14.5)
GLOBULIN SER-MCNC: 2.4 G/DL (ref 2–4)
GLUCOSE SERPL-MCNC: 102 MG/DL (ref 82–115)
HCT VFR BLD AUTO: 33.2 % (ref 38–47)
HGB BLD-MCNC: 10.4 G/DL (ref 12–16)
HYPOCHROMIA BLD QL SMEAR: ABNORMAL
IMM GRANULOCYTES # BLD AUTO: 0.03 K/UL (ref 0–0.04)
IMM GRANULOCYTES NFR BLD: 0.4 % (ref 0–0.4)
LYMPHOCYTES # BLD AUTO: 2.2 K/UL (ref 1–4.8)
LYMPHOCYTES NFR BLD AUTO: 28.9 % (ref 27–41)
MCH RBC QN AUTO: 20.9 PG (ref 27–31)
MCHC RBC AUTO-ENTMCNC: 31.3 G/DL (ref 32–36)
MCV RBC AUTO: 66.7 FL (ref 80–96)
MICROCYTES BLD QL SMEAR: ABNORMAL
MONOCYTES # BLD AUTO: 0.42 K/UL (ref 0–0.8)
MONOCYTES NFR BLD AUTO: 5.5 % (ref 2–6)
MPC BLD CALC-MCNC: 11.7 FL (ref 9.4–12.4)
NEUTROPHILS # BLD AUTO: 4.93 K/UL (ref 1.8–7.7)
NEUTROPHILS NFR BLD AUTO: 64.8 % (ref 53–65)
NRBC # BLD AUTO: 0 X10E3/UL
NRBC, AUTO (.00): 0 %
OVALOCYTES BLD QL SMEAR: ABNORMAL
PLATELET # BLD AUTO: 380 K/UL (ref 150–400)
PLATELET MORPHOLOGY: ABNORMAL
POTASSIUM SERPL-SCNC: 4.2 MMOL/L (ref 3.5–5.1)
PROT SERPL-MCNC: 6.2 G/DL (ref 5.8–7.6)
RBC # BLD AUTO: 4.98 M/UL (ref 4.2–5.4)
SODIUM SERPL-SCNC: 140 MMOL/L (ref 136–145)
TROPONIN I SERPL HS-MCNC: <2.7 NG/L
WBC # BLD AUTO: 7.61 K/UL (ref 4.5–11)

## 2025-07-01 PROCEDURE — 93010 ELECTROCARDIOGRAM REPORT: CPT | Performed by: HOSPITALIST

## 2025-07-01 PROCEDURE — 84484 ASSAY OF TROPONIN QUANT: CPT | Performed by: NURSE PRACTITIONER

## 2025-07-01 PROCEDURE — 99284 EMERGENCY DEPT VISIT MOD MDM: CPT | Mod: 25

## 2025-07-01 PROCEDURE — 93005 ELECTROCARDIOGRAM TRACING: CPT

## 2025-07-01 PROCEDURE — 96374 THER/PROPH/DIAG INJ IV PUSH: CPT

## 2025-07-01 PROCEDURE — 80053 COMPREHEN METABOLIC PANEL: CPT | Performed by: NURSE PRACTITIONER

## 2025-07-01 PROCEDURE — 63600175 PHARM REV CODE 636 W HCPCS: Performed by: NURSE PRACTITIONER

## 2025-07-01 PROCEDURE — 99284 EMERGENCY DEPT VISIT MOD MDM: CPT | Mod: GF | Performed by: NURSE PRACTITIONER

## 2025-07-01 PROCEDURE — 96375 TX/PRO/DX INJ NEW DRUG ADDON: CPT

## 2025-07-01 PROCEDURE — 85025 COMPLETE CBC W/AUTO DIFF WBC: CPT | Performed by: NURSE PRACTITIONER

## 2025-07-01 PROCEDURE — 99214 OFFICE O/P EST MOD 30 MIN: CPT | Mod: ,,,

## 2025-07-01 RX ORDER — AZITHROMYCIN 250 MG/1
250 TABLET, FILM COATED ORAL DAILY
Qty: 6 TABLET | Refills: 0 | Status: SHIPPED | OUTPATIENT
Start: 2025-07-01

## 2025-07-01 RX ORDER — FAMOTIDINE 10 MG/ML
20 INJECTION, SOLUTION INTRAVENOUS
Status: COMPLETED | OUTPATIENT
Start: 2025-07-01 | End: 2025-07-01

## 2025-07-01 RX ORDER — DIPHENHYDRAMINE HYDROCHLORIDE 50 MG/ML
25 INJECTION, SOLUTION INTRAMUSCULAR; INTRAVENOUS
Status: COMPLETED | OUTPATIENT
Start: 2025-07-01 | End: 2025-07-01

## 2025-07-01 RX ORDER — METHYLPREDNISOLONE SOD SUCC 125 MG
125 VIAL (EA) INJECTION
Status: COMPLETED | OUTPATIENT
Start: 2025-07-01 | End: 2025-07-01

## 2025-07-01 RX ORDER — PREDNISONE 20 MG/1
40 TABLET ORAL DAILY
Qty: 10 TABLET | Refills: 0 | Status: SHIPPED | OUTPATIENT
Start: 2025-07-01 | End: 2025-07-06

## 2025-07-01 RX ADMIN — METHYLPREDNISOLONE SODIUM SUCCINATE 125 MG: 125 INJECTION, POWDER, FOR SOLUTION INTRAMUSCULAR; INTRAVENOUS at 04:07

## 2025-07-01 RX ADMIN — FAMOTIDINE 20 MG: 10 INJECTION, SOLUTION INTRAVENOUS at 04:07

## 2025-07-01 RX ADMIN — DIPHENHYDRAMINE HYDROCHLORIDE 25 MG: 50 INJECTION INTRAMUSCULAR; INTRAVENOUS at 04:07

## 2025-07-01 NOTE — ED PROVIDER NOTES
Encounter Date: 7/1/2025       History     Chief Complaint   Patient presents with    Facial Swelling    Urticaria    Oral Swelling    Itching     Patient presents to the ED from the [facility] with complaints of upper lip swelling, rash to her arms and back and states her throat feels hoarse. Patient also reported chest pressure to  but denies any pain upon evaluation. Patient reports upper lip swelling for several weeks but rash and hoarseness started in the last day.     The history is provided by the patient.     Review of patient's allergies indicates:  No Known Allergies  Past Medical History:   Diagnosis Date    Bipolar disorder     GERD (gastroesophageal reflux disease)     Hypothyroidism     Schizophrenia, unspecified      Past Surgical History:   Procedure Laterality Date    HYSTERECTOMY      TONSILLECTOMY       No family history on file.  Social History[1]  Review of Systems   Constitutional: Negative.    HENT:  Positive for sore throat.    Respiratory:  Positive for chest tightness.    Cardiovascular: Negative.    Musculoskeletal: Negative.    Skin:  Positive for rash.   Neurological: Negative.    Psychiatric/Behavioral: Negative.     All other systems reviewed and are negative.      Physical Exam     Initial Vitals [07/01/25 1555]   BP Pulse Resp Temp SpO2   125/68 102 (!) 22 98.3 °F (36.8 °C) 95 %      MAP       --         Physical Exam    Vitals reviewed.  Constitutional: She appears well-developed and well-nourished.   HENT: Mouth/Throat: Posterior oropharyngeal erythema present.   Cardiovascular:  Normal rate, regular rhythm, normal heart sounds and intact distal pulses.           Pulmonary/Chest: Breath sounds normal.   Musculoskeletal:         General: Normal range of motion.     Neurological: She is alert and oriented to person, place, and time. She has normal strength.   Skin: Skin is warm and dry. Capillary refill takes less than 2 seconds. Rash (bilateral arms and back) noted.    Psychiatric: She has a normal mood and affect. Her behavior is normal. Judgment and thought content normal.         Medical Screening Exam   See Full Note    ED Course   Procedures  Labs Reviewed   COMPREHENSIVE METABOLIC PANEL - Abnormal       Result Value    Sodium 140      Potassium 4.2      Chloride 106      CO2 22 (*)     Anion Gap 16      Glucose 102      BUN 12      Creatinine 1.02      BUN/Creatinine Ratio 12      Calcium 8.8      Total Protein 6.2      Albumin 3.8      Globulin 2.4      A/G Ratio 1.6      Bilirubin, Total 0.3      Alk Phos 85      ALT 8      AST 21      eGFR 59 (*)    CBC WITH DIFFERENTIAL - Abnormal    WBC 7.61      RBC 4.98      Hemoglobin 10.4 (*)     Hematocrit 33.2 (*)     MCV 66.7 (*)     MCH 20.9 (*)     MCHC 31.3 (*)     RDW 17.2 (*)     Platelet Count 380      MPV 11.7      Neutrophils % 64.8      Lymphocytes % 28.9      Monocytes % 5.5      Eosinophils % 0.3 (*)     Basophils % 0.1      Immature Granulocytes % 0.4      nRBC, Auto 0.0      Neutrophils, Abs 4.93      Lymphocytes, Absolute 2.20      Monocytes, Absolute 0.42      Eosinophils, Absolute 0.02      Basophils, Absolute 0.01      Immature Granulocytes, Absolute 0.03      nRBC, Absolute 0.00      Diff Type Scan Smear     CBC MORPHOLOGY - Abnormal    Platelet Morphology Few Large Platelets (*)     Anisocytosis 1+      Microcytosis 2+      Ovalocytes Few      Hypochromic 2+     TROPONIN I - Normal    Troponin I High Sensitivity <2.7     CBC W/ AUTO DIFFERENTIAL    Narrative:     The following orders were created for panel order CBC auto differential.  Procedure                               Abnormality         Status                     ---------                               -----------         ------                     CBC with Differential[[id number]]       Abnormal            Final result                 Please view results for these tests on the individual orders.          Imaging Results    None          Medications    diphenhydrAMINE injection 25 mg (25 mg Intravenous Given 7/1/25 1615)   methylPREDNISolone sodium succinate injection 125 mg (125 mg Intravenous Given 7/1/25 1616)   famotidine (PF) injection 20 mg (20 mg Intravenous Given 7/1/25 1616)     Medical Decision Making  MDM    Patient presents for emergent evaluation of acute hoarseness, upper lip swelling and chest pressure that poses a threat to life and/or bodily function.    In the ED patient found to have acute pharyngitis with allergic reaction.    I ordered labs and personally reviewed them.  Labs significant for WBC 7.61, H/H 10.4/33.2, Platelets 380, Na 140, K 4.2, Cl 106, BUN 12, Creat 1.02, Troponin <2.7  I ordered EKG and personally reviewed it.  EKG significant for NSR.      Discharge MDM  I discussed the treatment and discharge plan with the patient.   Patient was managed in the ED with IV Benadryl, Solu-Medrol and Pepcid.    The response to treatment was good relief of rash and swelling.    Patient was discharged in stable condition.  Detailed return precautions discussed.    Amount and/or Complexity of Data Reviewed  Labs: ordered.    Risk  Prescription drug management.                                      Clinical Impression:   Final diagnoses:  [R07.89] Chest pressure  [T78.40XA] Allergic reaction, initial encounter (Primary)  [J02.9] Pharyngitis, unspecified etiology        ED Disposition Condition    Discharge Stable          ED Prescriptions       Medication Sig Dispense Start Date End Date Auth. Provider    azithromycin (ZITHROMAX Z-CHESTER) 250 MG tablet Take 1 tablet (250 mg total) by mouth once daily. Take 500 mg initially then 250 mg daily. 6 tablet 7/1/2025 -- Nhi Chavez FNP    predniSONE (DELTASONE) 20 MG tablet Take 2 tablets (40 mg total) by mouth once daily. for 5 days 10 tablet 7/1/2025 7/6/2025 Nhi Chavez FNP          Follow-up Information    None            [1]   Social History  Tobacco Use    Smoking status: Never     Passive  exposure: Past    Smokeless tobacco: Never   Substance Use Topics    Alcohol use: Never    Drug use: Never        Nhi Chavez, [facility]  07/01/25 1747

## 2025-07-02 LAB
OHS QRS DURATION: 70 MS
OHS QTC CALCULATION: 446 MS

## 2025-07-02 NOTE — PROGRESS NOTES
Subjective     Patient ID: Felicia Grijalva is a 70 y.o. female.    Chief Complaint: Facial Swelling    Presents to clinic for another bout of facial swelling. This occurrence began after lunch and has made her have difficulty talking, widespread rash and face is still noted to be puffy. Due to escalation of symptoms and patient audibly hoarse and complaining of chest pain radiating to her back I recommended prompt ER evaluation.       Review of Systems   HENT:  Positive for facial swelling and voice change.    Respiratory:  Positive for chest tightness.    Cardiovascular:  Positive for chest pain.   Musculoskeletal:  Positive for back pain.   Integumentary:  Positive for rash.          Objective     Physical Exam  Vitals and nursing note reviewed.   Constitutional:       Appearance: Normal appearance. She is normal weight. She is toxic-appearing.   HENT:      Mouth/Throat:      Pharynx: Pharyngeal swelling present.      Comments: Lip swelling, eyes puffy  Cardiovascular:      Rate and Rhythm: Normal rate and regular rhythm.      Pulses: Normal pulses.      Heart sounds: Normal heart sounds.   Pulmonary:      Effort: Pulmonary effort is normal.      Breath sounds: Normal breath sounds.   Skin:     General: Skin is warm and dry.      Capillary Refill: Capillary refill takes less than 2 seconds.      Findings: Rash present.   Neurological:      Mental Status: She is alert and oriented to person, place, and time.            Assessment and Plan     1. Allergic reaction, subsequent encounter        Go directly to ER and follow up with dr. Pearson in regards to medication changes          No follow-ups on file.    I spent a total of 5 minutes on the day of the visit.This includes face to face time and non-face to face time preparing to see the patient (eg, review of tests), obtaining and/or reviewing separately obtained history, documenting clinical information in the electronic or other health record, independently  interpreting results and communicating results to the patient/family/caregiver, or care coordinator.    ROCKY DesaiC

## 2025-07-21 ENCOUNTER — TELEPHONE (OUTPATIENT)
Dept: FAMILY MEDICINE | Facility: CLINIC | Age: 70
End: 2025-07-21
Payer: MEDICARE

## 2025-07-21 NOTE — TELEPHONE ENCOUNTER
Copied from CRM #[id number]. Topic: Medications - New Medication Request  >> Jul 21, 2025  2:36 PM Tanisha Pope wrote:  Who Called: Felicia Grijalva  Refill   pantoprazole (PROTONIX) 40 MG tablet  Send to [facility]     Preferred Method of Contact: Phone Call  Patient's Preferred Phone Number on File: [phone]   Best number to call back [phone]

## 2025-07-22 DIAGNOSIS — K21.9 GASTROESOPHAGEAL REFLUX DISEASE, UNSPECIFIED WHETHER ESOPHAGITIS PRESENT: Primary | ICD-10-CM

## 2025-07-22 RX ORDER — PANTOPRAZOLE SODIUM 40 MG/1
40 TABLET, DELAYED RELEASE ORAL DAILY
Qty: 90 TABLET | Refills: 0 | Status: SHIPPED | OUTPATIENT
Start: 2025-07-22

## 2025-08-01 ENCOUNTER — HOSPITAL ENCOUNTER (EMERGENCY)
Facility: HOSPITAL | Age: 70
Discharge: HOME | End: 2025-08-01
Payer: MEDICARE

## 2025-08-01 VITALS
WEIGHT: 137 LBS | TEMPERATURE: 98 F | SYSTOLIC BLOOD PRESSURE: 130 MMHG | OXYGEN SATURATION: 97 % | RESPIRATION RATE: 20 BRPM | BODY MASS INDEX: 25.86 KG/M2 | HEIGHT: 61 IN | DIASTOLIC BLOOD PRESSURE: 93 MMHG | HEART RATE: 92 BPM

## 2025-08-01 DIAGNOSIS — E87.1 HYPONATREMIA: ICD-10-CM

## 2025-08-01 DIAGNOSIS — T78.40XA ALLERGIC REACTION, INITIAL ENCOUNTER: Primary | ICD-10-CM

## 2025-08-01 LAB
ALBUMIN SERPL BCP-MCNC: 3.8 G/DL (ref 3.4–4.8)
ALBUMIN/GLOB SERPL: 2.1 {RATIO}
ALP SERPL-CCNC: 86 U/L (ref 40–150)
ALT SERPL W P-5'-P-CCNC: 7 U/L
ANION GAP SERPL CALCULATED.3IONS-SCNC: 13 MMOL/L (ref 7–16)
AST SERPL W P-5'-P-CCNC: 18 U/L (ref 11–45)
BASOPHILS # BLD AUTO: 0.01 K/UL (ref 0–0.2)
BASOPHILS NFR BLD AUTO: 0.1 % (ref 0–1)
BILIRUB SERPL-MCNC: 0.5 MG/DL
BUN SERPL-MCNC: 6 MG/DL (ref 10–20)
BUN/CREAT SERPL: 8 (ref 6–20)
CALCIUM SERPL-MCNC: 8.6 MG/DL (ref 8.4–10.2)
CHLORIDE SERPL-SCNC: 99 MMOL/L (ref 98–107)
CO2 SERPL-SCNC: 22 MMOL/L (ref 23–31)
CREAT SERPL-MCNC: 0.78 MG/DL (ref 0.55–1.02)
DIFFERENTIAL METHOD BLD: ABNORMAL
EGFR (NO RACE VARIABLE) (RUSH/TITUS): 81 ML/MIN/1.73M2
EOSINOPHIL # BLD AUTO: 0.1 K/UL (ref 0–0.5)
EOSINOPHIL NFR BLD AUTO: 1.1 % (ref 1–4)
ERYTHROCYTE [DISTWIDTH] IN BLOOD BY AUTOMATED COUNT: 21.2 % (ref 11.5–14.5)
GLOBULIN SER-MCNC: 1.8 G/DL (ref 2–4)
GLUCOSE SERPL-MCNC: 109 MG/DL (ref 82–115)
HCT VFR BLD AUTO: 30.3 % (ref 38–47)
HGB BLD-MCNC: 9.5 G/DL (ref 12–16)
IMM GRANULOCYTES # BLD AUTO: 0.02 K/UL (ref 0–0.04)
IMM GRANULOCYTES NFR BLD: 0.2 % (ref 0–0.4)
LYMPHOCYTES # BLD AUTO: 1.07 K/UL (ref 1–4.8)
LYMPHOCYTES NFR BLD AUTO: 11.7 % (ref 27–41)
MCH RBC QN AUTO: 20.8 PG (ref 27–31)
MCHC RBC AUTO-ENTMCNC: 31.4 G/DL (ref 32–36)
MCV RBC AUTO: 66.3 FL (ref 80–96)
MONOCYTES # BLD AUTO: 0.16 K/UL (ref 0–0.8)
MONOCYTES NFR BLD AUTO: 1.7 % (ref 2–6)
MPC BLD CALC-MCNC: 10.5 FL (ref 9.4–12.4)
NEUTROPHILS # BLD AUTO: 7.82 K/UL (ref 1.8–7.7)
NEUTROPHILS NFR BLD AUTO: 85.2 % (ref 53–65)
NRBC # BLD AUTO: 0 X10E3/UL
NRBC, AUTO (.00): 0 %
PLATELET # BLD AUTO: 314 K/UL (ref 150–400)
POTASSIUM SERPL-SCNC: 4.2 MMOL/L (ref 3.5–5.1)
PROT SERPL-MCNC: 5.6 G/DL (ref 5.8–7.6)
RBC # BLD AUTO: 4.57 M/UL (ref 4.2–5.4)
SODIUM SERPL-SCNC: 130 MMOL/L (ref 136–145)
WBC # BLD AUTO: 9.18 K/UL (ref 4.5–11)

## 2025-08-01 PROCEDURE — 99284 EMERGENCY DEPT VISIT MOD MDM: CPT | Mod: GF | Performed by: NURSE PRACTITIONER

## 2025-08-01 PROCEDURE — 99284 EMERGENCY DEPT VISIT MOD MDM: CPT | Mod: 25

## 2025-08-01 PROCEDURE — 96361 HYDRATE IV INFUSION ADD-ON: CPT

## 2025-08-01 PROCEDURE — 96375 TX/PRO/DX INJ NEW DRUG ADDON: CPT

## 2025-08-01 PROCEDURE — 96374 THER/PROPH/DIAG INJ IV PUSH: CPT

## 2025-08-01 PROCEDURE — 80053 COMPREHEN METABOLIC PANEL: CPT | Performed by: NURSE PRACTITIONER

## 2025-08-01 PROCEDURE — 63600175 PHARM REV CODE 636 W HCPCS: Performed by: NURSE PRACTITIONER

## 2025-08-01 PROCEDURE — 25000003 PHARM REV CODE 250: Performed by: NURSE PRACTITIONER

## 2025-08-01 PROCEDURE — 85025 COMPLETE CBC W/AUTO DIFF WBC: CPT | Performed by: NURSE PRACTITIONER

## 2025-08-01 RX ORDER — FAMOTIDINE 20 MG/1
20 TABLET, FILM COATED ORAL 2 TIMES DAILY
Qty: 14 TABLET | Refills: 0 | Status: SHIPPED | OUTPATIENT
Start: 2025-08-01 | End: 2025-08-08

## 2025-08-01 RX ORDER — PREDNISONE 20 MG/1
40 TABLET ORAL DAILY
Qty: 6 TABLET | Refills: 0 | Status: SHIPPED | OUTPATIENT
Start: 2025-08-02 | End: 2025-08-05

## 2025-08-01 RX ORDER — DIPHENHYDRAMINE HYDROCHLORIDE 50 MG/ML
25 INJECTION, SOLUTION INTRAMUSCULAR; INTRAVENOUS
Status: COMPLETED | OUTPATIENT
Start: 2025-08-01 | End: 2025-08-01

## 2025-08-01 RX ORDER — METHYLPREDNISOLONE SOD SUCC 125 MG
125 VIAL (EA) INJECTION
Status: COMPLETED | OUTPATIENT
Start: 2025-08-01 | End: 2025-08-01

## 2025-08-01 RX ORDER — FAMOTIDINE 10 MG/ML
20 INJECTION, SOLUTION INTRAVENOUS
Status: COMPLETED | OUTPATIENT
Start: 2025-08-01 | End: 2025-08-01

## 2025-08-01 RX ADMIN — FAMOTIDINE 20 MG: 10 INJECTION, SOLUTION INTRAVENOUS at 11:08

## 2025-08-01 RX ADMIN — DIPHENHYDRAMINE HYDROCHLORIDE 25 MG: 50 INJECTION, SOLUTION INTRAMUSCULAR; INTRAVENOUS at 11:08

## 2025-08-01 RX ADMIN — SODIUM CHLORIDE 500 ML: 0.9 INJECTION, SOLUTION INTRAVENOUS at 11:08

## 2025-08-01 RX ADMIN — METHYLPREDNISOLONE SODIUM SUCCINATE 125 MG: 125 INJECTION, POWDER, LYOPHILIZED, FOR SOLUTION INTRAMUSCULAR; INTRAVENOUS at 11:08

## 2025-08-01 NOTE — ED PROVIDER NOTES
Encounter Date: 8/1/2025       History     Chief Complaint   Patient presents with    Allergic Reaction     Patient has hives mostly to her trunk that started on her back 2 days ago, has been taking Vistaril to help but it continues to progress     Pt presents to the ER for concerns for allergic reaction.  She reports rash to BUE and trunk. Denies SOB, wheezing, facial/oral swelling, n/v/d.  Pt took Hydroxyzine 50mg at 7am today; she takes 50mg PO TID normally.  She had an allergic reaction on 7/1/25, and she was seen here in the ER.  They thought it was her Zyprexa, so it was discontinued.  She has not been referred to a allergist yet, which I plan to do at discharge. I talked with patient and family to discuss her psych meds, to explain these skin eruptions could be allergic vs drug eruptions.  However, we want to rule out allergy.  She is appreciative for referral. No new meds, foods or other potential allergens. Family reports she just keeps having these reactions. Pt reports the rash does itch, but improved after she applied lotion.     The history is provided by the patient.   Allergic Reaction  The primary symptoms are  rash and urticaria. The primary symptoms do not include wheezing, shortness of breath, cough, abdominal pain, nausea, vomiting, diarrhea, dizziness, palpitations, altered mental status or angioedema. The current episode started 2 to 3 hours ago. The problem has not changed since onset.  The rash began today. The rash is associated with itching. The rash is not associated with blisters or weeping.   Significant symptoms also include itching. Significant symptoms that are not present include eye redness, flushing or rhinorrhea.     Review of patient's allergies indicates:   Allergen Reactions    Zyprexa [olanzapine] Swelling     Past Medical History:   Diagnosis Date    Bipolar disorder     GERD (gastroesophageal reflux disease)     Hypothyroidism     Schizophrenia, unspecified      Past  Surgical History:   Procedure Laterality Date    HYSTERECTOMY      TONSILLECTOMY       No family history on file.  Social History[1]  Review of Systems   Constitutional:  Negative for fever.   HENT:  Negative for rhinorrhea and sore throat.    Eyes:  Negative for redness.   Respiratory:  Negative for cough, shortness of breath and wheezing.    Cardiovascular:  Negative for chest pain and palpitations.   Gastrointestinal:  Negative for abdominal pain, diarrhea, nausea and vomiting.   Genitourinary:  Negative for dysuria.   Musculoskeletal:  Negative for back pain.   Skin:  Positive for itching and rash. Negative for flushing.   Neurological:  Negative for dizziness and weakness.   Hematological:  Does not bruise/bleed easily.       Physical Exam     Initial Vitals [08/01/25 1049]   BP Pulse Resp Temp SpO2   (!) 123/92 101 (!) 22 97.9 °F (36.6 °C) 97 %      MAP       --         Physical Exam    Nursing note and vitals reviewed.  Constitutional: She appears well-developed and well-nourished. She is not diaphoretic. She is cooperative.  Non-toxic appearance. No distress. She is not intubated.   HENT:   Head: Normocephalic and atraumatic. Mouth/Throat: Uvula is midline and oropharynx is clear and moist. No trismus in the jaw. No uvula swelling. No posterior oropharyngeal edema.   Eyes: Pupils are equal, round, and reactive to light.   Neck: Trachea normal and phonation normal. Neck supple.   Cardiovascular:  Normal rate, regular rhythm, normal heart sounds and intact distal pulses.     Exam reveals no gallop and no friction rub.       No murmur heard.  Pulmonary/Chest: Effort normal and breath sounds normal. No accessory muscle usage. No apnea, no tachypnea and no bradypnea. She is not intubated. No respiratory distress. She has no decreased breath sounds. She has no wheezes. She has no rhonchi. She has no rales. She exhibits no tenderness.   Musculoskeletal:      Cervical back: Neck supple.     Neurological: She is  alert and oriented to person, place, and time.   Pt has rhythmic jerking movements to head/BUE.  HX of tardive dyskinesia.    Skin: Skin is warm and dry. Capillary refill takes less than 2 seconds. Rash noted.   Rash to trunk and left arm is flat and erythematous.  Right arm noted with urticaric rash   Psychiatric: She has a normal mood and affect. Her speech is normal and behavior is normal. Her mood appears not anxious.         Medical Screening Exam   See Full Note    ED Course   Procedures  Labs Reviewed   COMPREHENSIVE METABOLIC PANEL - Abnormal       Result Value    Sodium 130 (*)     Potassium 4.2      Chloride 99      CO2 22 (*)     Anion Gap 13      Glucose 109      BUN 6 (*)     Creatinine 0.78      BUN/Creatinine Ratio 8      Calcium 8.6      Total Protein 5.6 (*)     Albumin 3.8      Globulin 1.8 (*)     A/G Ratio 2.1      Bilirubin, Total 0.5      Alk Phos 86      ALT 7      AST 18      eGFR 81     CBC WITH DIFFERENTIAL - Abnormal    WBC 9.18      RBC 4.57      Hemoglobin 9.5 (*)     Hematocrit 30.3 (*)     MCV 66.3 (*)     MCH 20.8 (*)     MCHC 31.4 (*)     RDW 21.2 (*)     Platelet Count 314      MPV 10.5      Neutrophils % 85.2 (*)     Lymphocytes % 11.7 (*)     Monocytes % 1.7 (*)     Eosinophils % 1.1      Basophils % 0.1      Immature Granulocytes % 0.2      nRBC, Auto 0.0      Neutrophils, Abs 7.82 (*)     Lymphocytes, Absolute 1.07      Monocytes, Absolute 0.16      Eosinophils, Absolute 0.10      Basophils, Absolute 0.01      Immature Granulocytes, Absolute 0.02      nRBC, Absolute 0.00      Diff Type Scan Smear     CBC W/ AUTO DIFFERENTIAL    Narrative:     The following orders were created for panel order CBC auto differential.  Procedure                               Abnormality         Status                     ---------                               -----------         ------                     CBC with Differential[[id number]]       Abnormal            Final result                  Please view results for these tests on the individual orders.          Imaging Results    None          Medications   sodium chloride 0.9% bolus 500 mL 500 mL (500 mLs Intravenous New Bag 8/1/25 1110)   methylPREDNISolone sodium succinate injection 125 mg (125 mg Intravenous Given 8/1/25 1111)   famotidine (PF) injection 20 mg (20 mg Intravenous Given 8/1/25 1111)   diphenhydrAMINE injection 25 mg (25 mg Intravenous Given 8/1/25 1110)     Medical Decision Making  Problems Addressed:  Allergic reaction, initial encounter: acute illness or injury  Hyponatremia: self-limited or minor problem    Amount and/or Complexity of Data Reviewed  External Data Reviewed: labs and notes.  Labs: ordered. Decision-making details documented in ED Course.    Risk  Prescription drug management.               ED Course as of 08/01/25 1137   Fri Aug 01, 2025   1111 Eos %: 1.1 [AG]   1111 Eos #: 0.10 [AG]   1112 No elevation of eosinophils today.  [AG]   1119 Sodium(!): 130 [AG]   1132 Rash is lightening up, no worsening. No swelling, SOB, wheezing, n/v.  Will dc home with a few days of steroids and pepcid.  She is already on antihistamine.  [AG]   1133 Form faxed to [facility] for referral.  They are out today, but will be back on Monday.  [AG]      ED Course User Index  [AG] Pau Newby FNP                           Clinical Impression:   Final diagnoses:  [T78.40XA] Allergic reaction, initial encounter (Primary)  [E87.1] Hyponatremia        ED Disposition Condition    Discharge Stable          ED Prescriptions       Medication Sig Dispense Start Date End Date Auth. Provider    famotidine (PEPCID) 20 MG tablet Take 1 tablet (20 mg total) by mouth 2 (two) times daily. for 7 days 14 tablet 8/1/2025 8/8/2025 Pau Newby FNP    predniSONE (DELTASONE) 20 MG tablet Take 2 tablets (40 mg total) by mouth once daily. for 3 days 6 tablet 8/2/2025 8/5/2025 Pau Newby FNP          Follow-up Information       Follow up With  Specialties Details Why Contact [place]  Schedule an appointment as soon as possible for a visit   [id number]  [facility] [address]  [phone]                 [1]   Social History  Tobacco Use    Smoking status: Never     Passive exposure: Past    Smokeless tobacco: Never   Vaping Use    Vaping status: Never Used   Substance Use Topics    Alcohol use: Never    Drug use: Never        Pau Newby, JAYLAN  08/01/25 1131

## 2025-08-01 NOTE — DISCHARGE INSTRUCTIONS
Continue with your prescribed Vistaril.  I have sent in a few days of a steroid and pepcid.  I have referred you to [facility].  They should be reaching out to you to schedule an appointment with an allergist.  Return to the ER for any new of worsening of your symptoms.  You sodium level was also noted to be low today, this could be due to low salt in your diet.  Be sure to have your healthcare provider recheck this.